# Patient Record
Sex: FEMALE | Race: BLACK OR AFRICAN AMERICAN | NOT HISPANIC OR LATINO | URBAN - METROPOLITAN AREA
[De-identification: names, ages, dates, MRNs, and addresses within clinical notes are randomized per-mention and may not be internally consistent; named-entity substitution may affect disease eponyms.]

---
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
## 2019-07-08 PROBLEM — Z00.00 ENCOUNTER FOR PREVENTIVE HEALTH EXAMINATION: Status: ACTIVE | Noted: 2019-07-08

## 2019-07-10 ENCOUNTER — APPOINTMENT (OUTPATIENT)
Dept: BARIATRICS | Facility: CLINIC | Age: 19
End: 2019-07-10
Payer: COMMERCIAL

## 2019-07-10 VITALS
DIASTOLIC BLOOD PRESSURE: 79 MMHG | HEART RATE: 98 BPM | OXYGEN SATURATION: 99 % | SYSTOLIC BLOOD PRESSURE: 127 MMHG | BODY MASS INDEX: 49.41 KG/M2 | TEMPERATURE: 98.3 F | WEIGHT: 293 LBS | HEIGHT: 64.5 IN

## 2019-07-10 PROCEDURE — 99203 OFFICE O/P NEW LOW 30 MIN: CPT

## 2019-07-10 NOTE — HISTORY OF PRESENT ILLNESS
[de-identified] : This is a super morbidly obese female with bmi of 65 who comes for potential bariatric surgery.  She did not finish high school and is working as a  at [employer] about 20 hours weekly.  She comes off to me as having a blunted affect.  I believe that she desperately needs bariatric surgery but I am concerned about her depression and want her to see Damaris Reese to see if needs ongoing support\par Also want her to start a weight loss program \par want to reevaluate following the above\par

## 2019-07-10 NOTE — ASSESSMENT
[FreeTextEntry1] : as above \par get egd\par see jude cisneros\par start medical weight loss\par and then re evaluate\par spent 30 minutes with patient

## 2019-07-10 NOTE — PHYSICAL EXAM
[Normal] : grossly intact [Obese, well nourished, in no acute distress] : obese, well nourished, in no acute distress [de-identified] : gynoid distribution [de-identified] : truncus adiposity

## 2019-07-17 ENCOUNTER — APPOINTMENT (OUTPATIENT)
Dept: BARIATRICS | Facility: CLINIC | Age: 19
End: 2019-07-17

## 2019-08-14 ENCOUNTER — APPOINTMENT (OUTPATIENT)
Dept: BARIATRICS | Facility: CLINIC | Age: 19
End: 2019-08-14

## 2019-08-30 ENCOUNTER — EMERGENCY (EMERGENCY)
Facility: HOSPITAL | Age: 19
LOS: 1 days | Discharge: HOME | End: 2019-08-30
Attending: EMERGENCY MEDICINE | Admitting: EMERGENCY MEDICINE
Payer: COMMERCIAL

## 2019-08-30 VITALS
SYSTOLIC BLOOD PRESSURE: 131 MMHG | RESPIRATION RATE: 16 BRPM | DIASTOLIC BLOOD PRESSURE: 75 MMHG | TEMPERATURE: 98 F | HEART RATE: 94 BPM | OXYGEN SATURATION: 100 %

## 2019-08-30 DIAGNOSIS — Z98.89 OTHER SPECIFIED POSTPROCEDURAL STATES: Chronic | ICD-10-CM

## 2019-08-30 PROCEDURE — 99282 EMERGENCY DEPT VISIT SF MDM: CPT

## 2019-08-30 RX ORDER — IBUPROFEN 200 MG
600 TABLET ORAL ONCE
Refills: 0 | Status: COMPLETED | OUTPATIENT
Start: 2019-08-30 | End: 2019-08-30

## 2019-08-30 RX ADMIN — Medication 600 MILLIGRAM(S): at 18:01

## 2019-08-30 NOTE — ED PROVIDER NOTE - NSFOLLOWUPINSTRUCTIONS_ED_ALL_ED_FT
Please apply warm compresses, take ibuprofen 600mg every 8 hours with food. Your symptoms could be due to salivary stone. To increase production of the saliva you should gently massage the face and suck on ivonne or jelly ranchers. Follow up with ENT in 1 week if your symptoms don't resolve. come back to ED if you have fever, redness, vision changes, ear drainage or any other concerning symptom.

## 2019-08-30 NOTE — ED PROVIDER NOTE - OBJECTIVE STATEMENT
19F with pmh of Type 2 DM (on metformin) p/w R facial swelling and mild pain x 30 min. Pain started while eating. The swelling has gone down considerably since. Report numbness at the site as well.  No f/c, ear pain, congestion, sore throat.  Pt never had a similar problem.

## 2019-08-30 NOTE — ED PROVIDER NOTE - PATIENT PORTAL LINK FT
You can access the FollowMyHealth Patient Portal offered by [facility] by registering at the following website: [email or web address]. By joining [employer]’s FollowMyHealth portal, you will also be able to view your health information using other applications (apps) compatible with our system.

## 2019-08-30 NOTE — ED PROVIDER NOTE - CLINICAL SUMMARY MEDICAL DECISION MAKING FREE TEXT BOX
19F with pmh of DM p/w R facial swelling that has now improved. Likely salivary stone vs muscle spasm, less likely infection. No signs of otitis externa. Plan for iburofen, pt given instructions for possible salivary stone.

## 2019-08-30 NOTE — ED PROVIDER NOTE - PHYSICAL EXAMINATION
GEN - NAD; well appearing; A+O x3   HEAD - NC/AT     EYES - EOMI, no conjunctival pallor, no scleral icterus  ENT -   mucous membranes  moist , no discharge. Clear TMs. No ear canal drainage.  minimal tenderness and swelling just inferior to ear lobe. No erythema. No parotid tenderness.   NECK - Neck supple  PULM - CTA b/l,  symmetric breath sounds  COR -  RRR, S1 S2, no murmurs  ABD - , ND, NT, soft, no guarding, no rebound, no masses    BACK - no CVA tenderness, nontender spine     EXTREMS - no edema, no deformity, warm and well perfused    SKIN - no rash or bruising      NEUROLOGIC - alert, sensation nl, motor 5/5 RUE/LUE/RLE/LLE. CN2-12 intact

## 2019-10-01 ENCOUNTER — EMERGENCY (EMERGENCY)
Facility: HOSPITAL | Age: 19
LOS: 1 days | Discharge: HOME | End: 2019-10-01
Attending: EMERGENCY MEDICINE | Admitting: EMERGENCY MEDICINE
Payer: COMMERCIAL

## 2019-10-01 VITALS
HEART RATE: 120 BPM | DIASTOLIC BLOOD PRESSURE: 76 MMHG | SYSTOLIC BLOOD PRESSURE: 154 MMHG | OXYGEN SATURATION: 100 % | TEMPERATURE: 99 F | RESPIRATION RATE: 18 BRPM

## 2019-10-01 DIAGNOSIS — Z98.89 OTHER SPECIFIED POSTPROCEDURAL STATES: Chronic | ICD-10-CM

## 2019-10-01 PROCEDURE — 99283 EMERGENCY DEPT VISIT LOW MDM: CPT

## 2019-10-02 VITALS — TEMPERATURE: 98 F

## 2019-10-02 LAB
ALBUMIN SERPL ELPH-MCNC: 3.9 G/DL — SIGNIFICANT CHANGE UP (ref 3.3–5)
ALP SERPL-CCNC: 52 U/L — SIGNIFICANT CHANGE UP (ref 40–120)
ALT FLD-CCNC: 13 U/L — SIGNIFICANT CHANGE UP (ref 4–33)
ANION GAP SERPL CALC-SCNC: 9 MMO/L — SIGNIFICANT CHANGE UP (ref 7–14)
AST SERPL-CCNC: 17 U/L — SIGNIFICANT CHANGE UP (ref 4–32)
BASOPHILS # BLD AUTO: 0.05 K/UL — SIGNIFICANT CHANGE UP (ref 0–0.2)
BASOPHILS NFR BLD AUTO: 0.6 % — SIGNIFICANT CHANGE UP (ref 0–2)
BILIRUB SERPL-MCNC: < 0.2 MG/DL — LOW (ref 0.2–1.2)
BUN SERPL-MCNC: 11 MG/DL — SIGNIFICANT CHANGE UP (ref 7–23)
CALCIUM SERPL-MCNC: 9.6 MG/DL — SIGNIFICANT CHANGE UP (ref 8.4–10.5)
CHLORIDE SERPL-SCNC: 102 MMOL/L — SIGNIFICANT CHANGE UP (ref 98–107)
CO2 SERPL-SCNC: 26 MMOL/L — SIGNIFICANT CHANGE UP (ref 22–31)
CREAT SERPL-MCNC: 0.99 MG/DL — SIGNIFICANT CHANGE UP (ref 0.5–1.3)
EOSINOPHIL # BLD AUTO: 0.11 K/UL — SIGNIFICANT CHANGE UP (ref 0–0.5)
EOSINOPHIL NFR BLD AUTO: 1.3 % — SIGNIFICANT CHANGE UP (ref 0–6)
GLUCOSE SERPL-MCNC: 180 MG/DL — HIGH (ref 70–99)
HCT VFR BLD CALC: 35.2 % — SIGNIFICANT CHANGE UP (ref 34.5–45)
HGB BLD-MCNC: 11 G/DL — LOW (ref 11.5–15.5)
IMM GRANULOCYTES NFR BLD AUTO: 0.2 % — SIGNIFICANT CHANGE UP (ref 0–1.5)
LIDOCAIN IGE QN: 11.9 U/L — SIGNIFICANT CHANGE UP (ref 7–60)
LYMPHOCYTES # BLD AUTO: 2.09 K/UL — SIGNIFICANT CHANGE UP (ref 1–3.3)
LYMPHOCYTES # BLD AUTO: 25.6 % — SIGNIFICANT CHANGE UP (ref 13–44)
MCHC RBC-ENTMCNC: 28.6 PG — SIGNIFICANT CHANGE UP (ref 27–34)
MCHC RBC-ENTMCNC: 31.3 % — LOW (ref 32–36)
MCV RBC AUTO: 91.7 FL — SIGNIFICANT CHANGE UP (ref 80–100)
MONOCYTES # BLD AUTO: 0.39 K/UL — SIGNIFICANT CHANGE UP (ref 0–0.9)
MONOCYTES NFR BLD AUTO: 4.8 % — SIGNIFICANT CHANGE UP (ref 2–14)
NEUTROPHILS # BLD AUTO: 5.49 K/UL — SIGNIFICANT CHANGE UP (ref 1.8–7.4)
NEUTROPHILS NFR BLD AUTO: 67.5 % — SIGNIFICANT CHANGE UP (ref 43–77)
NRBC # FLD: 0 K/UL — SIGNIFICANT CHANGE UP (ref 0–0)
PLATELET # BLD AUTO: 299 K/UL — SIGNIFICANT CHANGE UP (ref 150–400)
PMV BLD: 9.5 FL — SIGNIFICANT CHANGE UP (ref 7–13)
POTASSIUM SERPL-MCNC: 4.9 MMOL/L — SIGNIFICANT CHANGE UP (ref 3.5–5.3)
POTASSIUM SERPL-SCNC: 4.9 MMOL/L — SIGNIFICANT CHANGE UP (ref 3.5–5.3)
PROT SERPL-MCNC: 7.9 G/DL — SIGNIFICANT CHANGE UP (ref 6–8.3)
RBC # BLD: 3.84 M/UL — SIGNIFICANT CHANGE UP (ref 3.8–5.2)
RBC # FLD: 13.8 % — SIGNIFICANT CHANGE UP (ref 10.3–14.5)
SODIUM SERPL-SCNC: 137 MMOL/L — SIGNIFICANT CHANGE UP (ref 135–145)
WBC # BLD: 8.15 K/UL — SIGNIFICANT CHANGE UP (ref 3.8–10.5)
WBC # FLD AUTO: 8.15 K/UL — SIGNIFICANT CHANGE UP (ref 3.8–10.5)

## 2019-10-02 NOTE — ED PROVIDER NOTE - PHYSICAL EXAMINATION
Gen: Well appearing in NAD  Head: NC/AT  Neck: trachea midline  Resp:  No distress  Abd: soft NT ND  Ext: no deformities  Neuro:  A&O appears non focal  Skin:  Warm and dry as visualized  Psych:  Normal affect and mood

## 2019-10-02 NOTE — ED ADULT NURSE REASSESSMENT NOTE - NS ED NURSE REASSESS COMMENT FT1
No acute distress at present. pt. A&Ox4, ambulatory with steady gait. Respirations even & unlabored. MD Silver aware urine not obtained, pt. unable to give sample at this time. As per MD, ok for discharge. No acute distress at present. pt. A&Ox4, ambulatory with steady gait. Respirations even & unlabored. Pt. calm and cooperative. Denies SI/HI. MD Silver aware urine not obtained, pt. unable to give sample at this time. As per MD, ok for discharge.

## 2019-10-02 NOTE — ED PROVIDER NOTE - OBJECTIVE STATEMENT
18 yo history of DM and fatty liver presenting with a transient episode of upper abd pain that was sharp and did not radiate.  Some nausea and no vomiting or diarrhea.  Has not had similar pain before. 20 yo history of DM and fatty liver presenting with a transient episode of upper abd pain that was sharp and did not radiate.  Some nausea and no vomiting or diarrhea.  Has not had similar pain before.  Started after eating rice and beans.  Lasted about 40 minutes now pain has completely resolved.

## 2019-10-02 NOTE — ED ADULT NURSE NOTE - NSIMPLEMENTINTERV_GEN_ALL_ED
Implemented All Universal Safety Interventions:  [place] to call system. Call bell, personal items and telephone within reach. Instruct patient to call for assistance. Room bathroom lighting operational. Non-slip footwear when patient is off stretcher. Physically safe environment: no spills, clutter or unnecessary equipment. Stretcher in lowest position, wheels locked, appropriate side rails in place.

## 2019-10-02 NOTE — ED PROVIDER NOTE - CLINICAL SUMMARY MEDICAL DECISION MAKING FREE TEXT BOX
pt with upper abd pain.  Resolved on its own.  Need to consider gallstones however with resolution acute christian or biliary obstruction unlikely.  Will get labs and if normal not progress to imaging at this time.

## 2019-10-02 NOTE — ED PROVIDER NOTE - PATIENT PORTAL LINK FT
You can access the FollowMyHealth Patient Portal offered by [facility] by registering at the following website: [email or web address]. By joining [facility] [employer]’s FollowMyHealth portal, you will also be able to view your health information using other applications (apps) compatible with our system.

## 2019-10-02 NOTE — ED ADULT NURSE NOTE - OBJECTIVE STATEMENT
Pt. received in room 27, A&Ox4, ambulatory. Pt. c/o diffuse abd pain that began around 10 pm. States "I had anxiety and it feels like it went numb." Numbness resolved at present, states numbness was in abdomen. Denies n/v/d, fever, chills, SOB, chest pain, dizziness, weakness, SI/HI. Last BM this morning. 20 gauge IV inserted in right ac, positive blood return, flushes without difficulty. Respirations are even & unlabored on room air. Will continue to monitor.

## 2019-10-02 NOTE — ED ADULT NURSE NOTE - CHIEF COMPLAINT QUOTE
c/o diffused abd pain onset 1 hour PTA ,pt reports  pain subsided, pt also endorses had anxiety and felt " like everything went numb" reports symptoms resolved, denies c/p SOB or palpitations, denies fever ro chills, reports last BM this morning, " normal", hx of fatty liver and DM.

## 2019-10-13 ENCOUNTER — EMERGENCY (EMERGENCY)
Facility: HOSPITAL | Age: 19
LOS: 1 days | Discharge: HOME | End: 2019-10-13
Attending: EMERGENCY MEDICINE | Admitting: PERSONAL EMERGENCY RESPONSE ATTENDANT
Payer: COMMERCIAL

## 2019-10-13 VITALS
HEART RATE: 90 BPM | TEMPERATURE: 98 F | OXYGEN SATURATION: 100 % | SYSTOLIC BLOOD PRESSURE: 148 MMHG | RESPIRATION RATE: 18 BRPM | DIASTOLIC BLOOD PRESSURE: 91 MMHG

## 2019-10-13 VITALS
RESPIRATION RATE: 16 BRPM | TEMPERATURE: 98 F | DIASTOLIC BLOOD PRESSURE: 79 MMHG | OXYGEN SATURATION: 100 % | HEART RATE: 98 BPM | SYSTOLIC BLOOD PRESSURE: 144 MMHG

## 2019-10-13 DIAGNOSIS — Z98.89 OTHER SPECIFIED POSTPROCEDURAL STATES: Chronic | ICD-10-CM

## 2019-10-13 LAB
ALBUMIN SERPL ELPH-MCNC: 4.4 G/DL — SIGNIFICANT CHANGE UP (ref 3.3–5)
ALP SERPL-CCNC: 55 U/L — SIGNIFICANT CHANGE UP (ref 40–120)
ALT FLD-CCNC: 12 U/L — SIGNIFICANT CHANGE UP (ref 4–33)
ANION GAP SERPL CALC-SCNC: 12 MMO/L — SIGNIFICANT CHANGE UP (ref 7–14)
AST SERPL-CCNC: 13 U/L — SIGNIFICANT CHANGE UP (ref 4–32)
BASE EXCESS BLDV CALC-SCNC: 2.1 MMOL/L — SIGNIFICANT CHANGE UP
BASOPHILS # BLD AUTO: 0.04 K/UL — SIGNIFICANT CHANGE UP (ref 0–0.2)
BASOPHILS NFR BLD AUTO: 0.6 % — SIGNIFICANT CHANGE UP (ref 0–2)
BILIRUB SERPL-MCNC: 0.3 MG/DL — SIGNIFICANT CHANGE UP (ref 0.2–1.2)
BLOOD GAS VENOUS - CREATININE: 0.95 MG/DL — SIGNIFICANT CHANGE UP (ref 0.5–1.3)
BUN SERPL-MCNC: 18 MG/DL — SIGNIFICANT CHANGE UP (ref 7–23)
CALCIUM SERPL-MCNC: 10 MG/DL — SIGNIFICANT CHANGE UP (ref 8.4–10.5)
CHLORIDE BLDV-SCNC: 107 MMOL/L — SIGNIFICANT CHANGE UP (ref 96–108)
CHLORIDE SERPL-SCNC: 101 MMOL/L — SIGNIFICANT CHANGE UP (ref 98–107)
CO2 SERPL-SCNC: 25 MMOL/L — SIGNIFICANT CHANGE UP (ref 22–31)
CREAT SERPL-MCNC: 1 MG/DL — SIGNIFICANT CHANGE UP (ref 0.5–1.3)
EOSINOPHIL # BLD AUTO: 0.12 K/UL — SIGNIFICANT CHANGE UP (ref 0–0.5)
EOSINOPHIL NFR BLD AUTO: 1.7 % — SIGNIFICANT CHANGE UP (ref 0–6)
GAS PNL BLDV: 136 MMOL/L — SIGNIFICANT CHANGE UP (ref 136–146)
GLUCOSE BLDV-MCNC: 109 MG/DL — HIGH (ref 70–99)
GLUCOSE SERPL-MCNC: 115 MG/DL — HIGH (ref 70–99)
HCO3 BLDV-SCNC: 25 MMOL/L — SIGNIFICANT CHANGE UP (ref 20–27)
HCT VFR BLD CALC: 36.2 % — SIGNIFICANT CHANGE UP (ref 34.5–45)
HCT VFR BLDV CALC: 37.9 % — SIGNIFICANT CHANGE UP (ref 34.5–45)
HGB BLD-MCNC: 11.8 G/DL — SIGNIFICANT CHANGE UP (ref 11.5–15.5)
HGB BLDV-MCNC: 12.3 G/DL — SIGNIFICANT CHANGE UP (ref 11.5–15.5)
IMM GRANULOCYTES NFR BLD AUTO: 0.3 % — SIGNIFICANT CHANGE UP (ref 0–1.5)
LACTATE BLDV-MCNC: 1.5 MMOL/L — SIGNIFICANT CHANGE UP (ref 0.5–2)
LYMPHOCYTES # BLD AUTO: 1.86 K/UL — SIGNIFICANT CHANGE UP (ref 1–3.3)
LYMPHOCYTES # BLD AUTO: 26.5 % — SIGNIFICANT CHANGE UP (ref 13–44)
MCHC RBC-ENTMCNC: 28.9 PG — SIGNIFICANT CHANGE UP (ref 27–34)
MCHC RBC-ENTMCNC: 32.6 % — SIGNIFICANT CHANGE UP (ref 32–36)
MCV RBC AUTO: 88.7 FL — SIGNIFICANT CHANGE UP (ref 80–100)
MONOCYTES # BLD AUTO: 0.41 K/UL — SIGNIFICANT CHANGE UP (ref 0–0.9)
MONOCYTES NFR BLD AUTO: 5.8 % — SIGNIFICANT CHANGE UP (ref 2–14)
NEUTROPHILS # BLD AUTO: 4.57 K/UL — SIGNIFICANT CHANGE UP (ref 1.8–7.4)
NEUTROPHILS NFR BLD AUTO: 65.1 % — SIGNIFICANT CHANGE UP (ref 43–77)
NRBC # FLD: 0 K/UL — SIGNIFICANT CHANGE UP (ref 0–0)
PCO2 BLDV: 48 MMHG — SIGNIFICANT CHANGE UP (ref 41–51)
PH BLDV: 7.37 PH — SIGNIFICANT CHANGE UP (ref 7.32–7.43)
PLATELET # BLD AUTO: 298 K/UL — SIGNIFICANT CHANGE UP (ref 150–400)
PMV BLD: 9.4 FL — SIGNIFICANT CHANGE UP (ref 7–13)
PO2 BLDV: 32 MMHG — LOW (ref 35–40)
POTASSIUM BLDV-SCNC: 3.9 MMOL/L — SIGNIFICANT CHANGE UP (ref 3.4–4.5)
POTASSIUM SERPL-MCNC: 4.3 MMOL/L — SIGNIFICANT CHANGE UP (ref 3.5–5.3)
POTASSIUM SERPL-SCNC: 4.3 MMOL/L — SIGNIFICANT CHANGE UP (ref 3.5–5.3)
PROT SERPL-MCNC: 8.6 G/DL — HIGH (ref 6–8.3)
RBC # BLD: 4.08 M/UL — SIGNIFICANT CHANGE UP (ref 3.8–5.2)
RBC # FLD: 13.4 % — SIGNIFICANT CHANGE UP (ref 10.3–14.5)
SAO2 % BLDV: 53.1 % — LOW (ref 60–85)
SODIUM SERPL-SCNC: 138 MMOL/L — SIGNIFICANT CHANGE UP (ref 135–145)
WBC # BLD: 7.02 K/UL — SIGNIFICANT CHANGE UP (ref 3.8–10.5)
WBC # FLD AUTO: 7.02 K/UL — SIGNIFICANT CHANGE UP (ref 3.8–10.5)

## 2019-10-13 PROCEDURE — 71046 X-RAY EXAM CHEST 2 VIEWS: CPT | Mod: 26

## 2019-10-13 PROCEDURE — 70491 CT SOFT TISSUE NECK W/DYE: CPT | Mod: 26

## 2019-10-13 PROCEDURE — 99284 EMERGENCY DEPT VISIT MOD MDM: CPT

## 2019-10-13 RX ORDER — LIDOCAINE 4 G/100G
10 CREAM TOPICAL ONCE
Refills: 0 | Status: COMPLETED | OUTPATIENT
Start: 2019-10-13 | End: 2019-10-13

## 2019-10-13 RX ORDER — PANTOPRAZOLE SODIUM 20 MG/1
1 TABLET, DELAYED RELEASE ORAL
Qty: 14 | Refills: 0
Start: 2019-10-13 | End: 2019-10-26

## 2019-10-13 RX ADMIN — LIDOCAINE 10 MILLILITER(S): 4 CREAM TOPICAL at 15:50

## 2019-10-13 NOTE — CHART NOTE - NSCHARTNOTEFT_GEN_A_CORE
s [facility]  Head & Neck Surgery Procedure Note      Name:                  Naty Villalta	               Surgeon:   Bryce Mariscal MD  	  Date                    10/13/2019                        Assistant:  None    Record Number:	[id number]                             Anesthesia:  Topical Anesthesia       Preoperative diagnosis:	Dysphagia, unspecified (R13.10)  	  Postoperative diagnosis:	Dysphagia, unspecified (R13.10)    Procedure:		Flexible Fiberoptic Laryngoscopy  (50099)    INDICATION:  The patient is a 19 year old obese female with a past medical history significant for DM who presents to the emergency room at [facility] with a chief compliant of throat pain and facial headaches for the past several hours.  The patient has been belching and throat pain/neck pain since this morning. As per patient states that last night she ate "white fish" and then this morning she woke up and had some anterior neck pain w/ throat pain a/w belching. Pt admits she was able to eat a cracker but then felt increasing pain of throat. Sx exacerbated with swallowing. Pt notes going to  and was sent to ED for further evaluation. Patient also with chronic sinus headaches and postnasal drip. Pt states at  tried to drink some water but was unable to tolerate it. Pt denies fevers, chills, drooling, chest pain, sob, n/v/d, tongue swelling/elevation. Trauma, discharge, abdominal pain or any other complaints.    PROCEDURE: The patient was seen and her bilateral nasal cavities were prepped and sprayed with topical anesthesia of neosynephrine.   Following this, a fiberoptic flexible laryngoscope was passed into the left nasal cavity, and then slowly and meticulously moved posteriorly to the nasopharynx.  There was no evidence of clotted blood within the left anterior and posterior superior lateral nasal wall. There was clear mucous within the nasal cavity.  Once at nasopharynx the skull base and lateral walls of the eustachian tubes were examined for lesions and masses.  There was no blood or masses within the nasopharynx. There was mild prominence of the nasopharyngeal soft tissue consistent with inflammatory reaction.  The fiberoptic endoscope was then carefully directed inferiorly and moved to the hypopharynx and glottis.  There was no fullness of the base of tongue.  There was 1-2+ prominence of the tonsils bilaterally (equally) and without exudate. There was no evidence of retropharyngeal erythema or edema.  There was mild  diffuse erythema  of the pharyngeal wall and supraglottic structure consistent with GERD.  The true vocal cords appeared to be mobile bilaterally.  There was no evidence of foreign body or pooling of secretions, or obvious aspiration or penetration of liquid into the glottis.  The airway was widely patent. The patient tolerated the procedure well..

## 2019-10-13 NOTE — ED PROVIDER NOTE - PHYSICAL EXAMINATION
Vital signs reviewed.   CONSTITUTIONAL: Well-appearing; well-nourished; in no apparent distress. Non-toxic appearing.   HEAD: Normocephalic, atraumatic.  EYES: PERRL, EOM intact, conjunctiva and sclera WNL.  ENT: normal nose; no rhinorrhea;  +enlarged tonsils Rt >Lt, uvula midline. No FB appreciated. Pt speaking in full sentences. No edema. No tongue elevation. No ludwigs nots. no lymphadenopathy.  NECK/LYMPH: Supple; non-tender; no cervical lymphadenopathy.  CARD: Normal S1, S2; no murmurs, rubs, or gallops noted.  RESP: Normal chest excursion with respiration; breath sounds clear and equal bilaterally; no wheezes, rhonchi, or rales.  EXT/MS: moves all extremities;   SKIN: Normal for age and race;   NEURO: Awake, alert, oriented x 3, no gross deficits  PSYCH: Normal mood; appropriate affect.

## 2019-10-13 NOTE — ED PROVIDER NOTE - CARE PROVIDER_API CALL
Bryce Mariscal)  Otolaryngology  [address] [place] [address], [address]  [place] [address]  Phone: [phone]  Fax: [phone]  Follow Up Time: 1-3 Days

## 2019-10-13 NOTE — ED ADULT NURSE NOTE - OBJECTIVE STATEMENT
19 yr old female presents to the ER with c/o feeling like there is something in her throat. Pt states she was chewing gum, spit the gm out and then felt like there was something in her throat no drooling noted no respiratory distress noted pt appears comfortable

## 2019-10-13 NOTE — ED ADULT NURSE NOTE - CHIEF COMPLAINT QUOTE
Pt  c/o "something stuck in her throat and belching  after chewing gum. Denies difficulty swallowing.  pt with hx of hypothyroidism

## 2019-10-13 NOTE — ED PROVIDER NOTE - NSFOLLOWUPCLINICS_GEN_ALL_ED_FT
[facility]  Gastroenterology  [address] [place] [id number]  Phone: [phone]  Fax:   Follow Up Time: 1-3 Days

## 2019-10-13 NOTE — ED PROVIDER NOTE - CLINICAL SUMMARY MEDICAL DECISION MAKING FREE TEXT BOX
Patient is a 19 y.o female with PMHx of DM who presents to ED c/o belching and throat pain/neck pain since this morning. DDx- includes but not limited to; r/o FB vs abscess. Plan- labs, fluids ct neck, viscous lidocaine, ENT consult.

## 2019-10-13 NOTE — ED ADULT TRIAGE NOTE - CHIEF COMPLAINT QUOTE
Pt  c/o "something stuck in her throat and belching  after chewing gum. Denies difficulty swallowing Pt  c/o "something stuck in her throat and belching  after chewing gum. Denies difficulty swallowing.  pt with hx of hypothyroidism

## 2019-10-13 NOTE — CHART NOTE - NSCHARTNOTEFT_GEN_A_CORE
s [facility]  Head & Neck Surgery Procedure Note    Name:                  Naty Villalta	               Surgeon:   Bryce Mariscal MD  	  Date                    10/13/2019                        Assistant:  None    Record Number:	[id number]                             Anesthesia:  Topical Anesthesia    Preoperative diagnosis:	Acute maxillary sinusitis, unspecified (J01.00);  Acute Pansinusitis (J01.40)    Postoperative diagnosis:	Same    Procedure:	              Bilateral maxillary sinus endoscopy  (12024)                                            Diagnostic Sphenoid Sinus Endoscopy (28356)      INDICATION:  The patient is a 19 year old obese female with a past medical history significant for DM who presents to the emergency room at [facility] with a chief compliant of throat pain and facial headaches for the past several hours.  The patient has been belching and throat pain/neck pain since this morning. As per patient states that last night she ate "white fish" and then this morning she woke up and had some anterior neck pain w/ throat pain a/w belching. Pt admits she was able to eat a cracker but then felt increasing pain of throat. Sx exacerbated with swallowing. Pt notes going to  and was sent to ED for further evaluation. Patient also with chronic sinus headaches and postnasal drip. Pt states at  tried to drink some water but was unable to tolerate it. Pt denies fevers, chills, drooling, chest pain, sob, n/v/d, tongue swelling/elevation. Trauma, discharge, abdominal pain or any other complaints.    PROCEDURE:  The patient was seen and her nasal cavities were prepped and sprayed with topical neosynephrine anesthesia.   Following this, a zero degree flexible endoscope was passed into the left nasal vault, and passed posteriorly to the nasopharynx.  There was no evidence of any blood emanating from the left posterior superior lateral nasal wall. The scope was then slowly withdrawn and then rotated superiorly to visualize the middle turbinate and the inferior meatus and osteomeatal complex. The OMC on the left side appeared patent with no evidence of pus or obstruction.  The Maxillary sinus on the left side was then accessed through the inferior meatus.  The maxillary sinus had mild to moderate amount of mucoserous fluid within it without any evidence of masses or lesions.  The frontal duct was then inspected and appeared clear without any mucoid material flowing from it.  The Septum appeared straight.  Next the endoscope was passed posteriorly to the sphenoid sinus. The sphenoid sinus was identified 30 degrees up from the nasal floor and approximately 6cm posterior to the nasal sill. The left sphenoid sinus was entered and had no evidence of purulence or masses. The scope was then slowly withdrawn.  The zero degree endoscope was then introduced into the right nasal cavity and passed posteriorly to the nasopharynx. There were no masses visible.  There was no evidence of any bleeding emanating from the right posterior septum.  The endoscope was then rotated superiorly to visualize the middle turbinate and the inferior meatus and osteomeatal complex. The Maxillary sinus on the right side was then accessed via the inferior meatus.  There was a minimal amount of mucoserous fluid within the maxillary sinus with no evidence of any masses or pus.  Again the septum appeared to be straight.  The scope was then passed posteriorly to the sphenoid sinus. The right sphenoid sinus was entered and had a minimal amount of mucoserous material within it.  The scope was then withdrawn.  The patient tolerated the procedure well.  There were no areas of telangiectasia along the anterior septum.  The scope was then withdrawn.  The patient tolerated the procedure well. [facility]  Head & Neck Surgery Procedure Note    Name:                  Naty Villalta	               Surgeon:   Bryce Mariscal MD  	  Date                    10/13/2019                        Assistant:  None    Record Number:	[id number]                             Anesthesia:  Topical Anesthesia    Preoperative diagnosis:	Rhinitis    Postoperative diagnosis:	Same    Procedure:	              Nasal Endoscopoy      INDICATION:  The patient is a 19 year old obese female with a past medical history significant for DM who presents to the emergency room at [facility] with a chief compliant of throat pain and facial headaches for the past several hours.  The patient has been belching and throat pain/neck pain since this morning. As per patient states that last night she ate "white fish" and then this morning she woke up and had some anterior neck pain w/ throat pain a/w belching. Pt admits she was able to eat a cracker but then felt increasing pain of throat. Sx exacerbated with swallowing. Pt notes going to  and was sent to ED for further evaluation. Patient also with chronic sinus headaches and postnasal drip. Pt states at  tried to drink some water but was unable to tolerate it. Pt denies fevers, chills, drooling, chest pain, sob, n/v/d, tongue swelling/elevation. Trauma, discharge, abdominal pain or any other complaints.    PROCEDURE:  The patient was seen and her nasal cavities were prepped and sprayed with topical neosynephrine anesthesia.   Following this, a zero degree flexible endoscope was passed into the left nasal vault, and passed posteriorly to the nasopharynx.  There was no evidence of any blood emanating from the left posterior superior lateral nasal wall. The scope was then slowly withdrawn and then rotated superiorly to visualize the middle turbinate and the inferior meatus and osteomeatal complex. The OMC on the left side appeared patent with no evidence of pus or obstruction.  The Maxillary sinus on the left side was then accessed through the inferior meatus.  The maxillary sinus had mild to moderate amount of mucoserous fluid within it without any evidence of masses or lesions.  The frontal duct was then inspected and appeared clear without any mucoid material flowing from it.  The Septum appeared straight.  Next the endoscope was passed posteriorly to the sphenoid sinus. The sphenoid sinus was identified 30 degrees up from the nasal floor and approximately 6cm posterior to the nasal sill. The left sphenoid sinus was entered and had no evidence of purulence or masses. The scope was then slowly withdrawn.  The zero degree endoscope was then introduced into the right nasal cavity and passed posteriorly to the nasopharynx. There were no masses visible.  There was no evidence of any bleeding emanating from the right posterior septum.  The endoscope was then rotated superiorly to visualize the middle turbinate and the inferior meatus and osteomeatal complex. The Maxillary sinus on the right side was then accessed via the inferior meatus.  There was a minimal amount of mucoserous fluid within the maxillary sinus with no evidence of any masses or pus.  Again the septum appeared to be straight.  The scope was then passed posteriorly to the sphenoid sinus. The right sphenoid sinus was entered and had a minimal amount of mucoserous material within it.  The scope was then withdrawn.  The patient tolerated the procedure well.  There were no areas of telangiectasia along the anterior septum.  The scope was then withdrawn.  The patient tolerated the procedure well.

## 2019-10-13 NOTE — ED PROVIDER NOTE - PROGRESS NOTE DETAILS
ALETA Gordon- spoke with ENT, states they will come and scope patient. ALETA Gordon- paged ENT to see about scoping patient. States that attending Dr. Mariscal will be by to scope patient. pt no distress talking in clear full sentences. Dr Mariscal at bedside. ENT consult pending. Endorse to DR Valadez Attending MD Zhang.  Pt signed out to me in stable condition pending ENT TBDC.  Pt is a 20 yo diabetic on metformin, ate fish yesterday FB sensation in throat.  ENT at bedside, told nursing can go.  Will close loop with ENT and likely d/c.  Feels better. pt no distress talking in clear full sentences. Dr Mariscal at bedside. ENT consult pending. Endorse to DR Zhang ALETA Gordon- Patient tolerating PO, CT scan neg for acute finding. ENT scoped, recommend PPI and diet as tolerated and patient advised to f/u outpatient. Pt eager to be dc home. All questions and concerns addressed. Advise soft diet. No complaints noted. Advised to return if worsening sx.

## 2019-10-13 NOTE — ED PROVIDER NOTE - PLAN OF CARE
Patient advised to follow up with PRIMARY CARE DOCTOR IN 1-2 DAYS   and told to return to the emergency department immediately for any new or concerning symptoms OR ANY OTHER COMPLAINTS. Patient agrees with plan.    Eat soft diet  Rest, stay hydrated   Take medication prescribed as directed   Return if any new or worsening symptoms   Follow with ENT and Gastroenterologist

## 2019-10-13 NOTE — ED PROVIDER NOTE - ATTENDING CONTRIBUTION TO CARE
Attending Statement: I have reviewed and agree with all pertinent clinical information, including history and physical exam and agree with treatment plan of the PA, except as noted.  18yo F hx of DM on metformin, depression from home co "throat pain since this morning" pt states "Hurts to swallow" "Something is there" pt has been able to eat, states she had fish yesterday, but doesn't recall any bone getting stuck. This morning at a lunch able, no vomit. no drooling. no change in voice. no fever/chills. no chest pain no sob no cough no abdominal pain pt went to two different UC before coming to ED.   Vital signs noted. mmm no drooling no stridor ambulatory in ED no distress. supple neck. talking in full clear sentences. mild swelling of tonsil right greater than left. tonsils not touching. no exudate. normal S1-S2 obese female nt abdomen  plan labs, ct neck, ENt to see pt.

## 2019-10-13 NOTE — ED PROVIDER NOTE - OBJECTIVE STATEMENT
HPI: Patient is a 19 y.o female with PMHx of DM who presents to ED c/o belching and throat pain/neck pain since this morning. As per patient states that last night she ate "white fish" and then this morning she woke up and had some anterior neck pain w/ throat pain a/w belching. Pt admits she was able to eat a cracker but then felt increasing pain of throat. Sx exacerbated with swallowing. Pt notes going to  and was sent to ED for further evaluation. Pt states at  tried to drink some water but was unable to tolerate it. Pt denies fevers, chills, drooling, chest pain, sob, n/v/d, tongue swelling/elevation. Trauma, discharge, abdominal pain or any other complaints.

## 2019-10-13 NOTE — CONSULT NOTE ADULT - COMMENTS
Vital Signs Last 24 Hrs  T(C): 36.7 (13 Oct 2019 18:16), Max: 36.7 (13 Oct 2019 13:45)  T(F): 98 (13 Oct 2019 18:16), Max: 98 (13 Oct 2019 13:45)  HR: 90 (13 Oct 2019 18:16) (90 - 98)  BP: 148/91 (13 Oct 2019 18:16) (144/79 - 148/91)  BP(mean): --  RR: 18 (13 Oct 2019 18:16) (16 - 18)  SpO2: 100% (13 Oct 2019 18:16) (100% - 100%)

## 2019-10-13 NOTE — ED PROVIDER NOTE - CARE PLAN
Principal Discharge DX:	Throat pain  Assessment and plan of treatment:	Patient advised to follow up with PRIMARY CARE DOCTOR IN 1-2 DAYS   and told to return to the emergency department immediately for any new or concerning symptoms OR ANY OTHER COMPLAINTS. Patient agrees with plan.    Eat soft diet  Rest, stay hydrated   Take medication prescribed as directed   Return if any new or worsening symptoms   Follow with ENT and Gastroenterologist

## 2019-10-13 NOTE — ED PROVIDER NOTE - PATIENT PORTAL LINK FT
You can access the FollowMyHealth Patient Portal offered by [facility] by registering at the following website: [email or web address]. By joining [employer]’s FollowMyHealth portal, you will also be able to view your health information using other applications (apps) compatible with our system.

## 2019-10-13 NOTE — CONSULT NOTE ADULT - ENMT COMMENTS
Flexible FIberoptic Laryngoscopy: clear nasopharynx to glottis, tvc mobile b/l, diffuse GERD, tvc mobile b/l, airway patent

## 2019-10-13 NOTE — CONSULT NOTE ADULT - NOSE
inflamed mucosa/congestion/nasal/sinus endoscopy: maxillary sinus with mucoid fluid b/l via inferior meatus, ss clear b/l/clear discharge

## 2019-10-13 NOTE — CONSULT NOTE ADULT - ASSESSMENT
Assessment:  The patient is a 19 year old obese female with a past medical history significant for DM who presents to the emergency room at [facility] with a chief compliant of throat pain and facial headaches for the past several hours. DDx: GERD and pharyngeal abrasion. Airway widely patent, no e/o foreign body    Plan:  1) PPI  2) diet as tolerated  3) f/u in ENT clinic PRN [phone]

## 2019-10-13 NOTE — CONSULT NOTE ADULT - ENT GEN HX ROS MEA POS PC
post-nasal discharge/throat pain/dysphagia/nasal discharge/nasal obstruction/sinus symptoms/nasal congestion

## 2019-10-13 NOTE — CONSULT NOTE ADULT - SUBJECTIVE AND OBJECTIVE BOX
HPI: The patient is a 19 year old obese female with a past medical history significant for DM who presents to the emergency room at [facility] with a chief compliant of throat pain and facial headaches for the past several hours.  The patient has been belching and throat pain/neck pain since this morning. As per patient states that last night she ate "white fish" and then this morning she woke up and had some anterior neck pain w/ throat pain a/w belching. Pt admits she was able to eat a cracker but then felt increasing pain of throat. Sx exacerbated with swallowing. Pt notes going to  and was sent to ED for further evaluation. Patient also with chronic sinus headaches and postnasal drip. Pt states at  tried to drink some water but was unable to tolerate it. Pt denies fevers, chills, drooling, chest pain, sob, n/v/d, tongue swelling/elevation. Trauma, discharge, abdominal pain or any other complaints.	    HIV:    HIV Status:  · Offered: Declined	    PAST MEDICAL/SURGICAL/FAMILY/SOCIAL HISTORY:    Past Medical History:  Depression    Diabetes    Hypothyroid    Menorrhagia.     Past Surgical History:  History of appendectomy.     Tobacco Usage:  · Tobacco Usage	Unknown if ever smoked	    ALLERGIES AND HOME MEDICATIONS:   Allergies:        Allergies:  	No Known Allergies:     Home Medications:   * Outpatient Medication Status not yet specified    LABS:  CBC Full  -  ( 13 Oct 2019 15:30 )  WBC Count : 7.02 K/uL  Hemoglobin : 11.8 g/dL  Hematocrit : 36.2 %  Platelet Count - Automated : 298 K/uL  Mean Cell Volume : 88.7 fL  Mean Cell Hemoglobin : 28.9 pg  Mean Cell Hemoglobin Concentration : 32.6 %  Auto Neutrophil # : 4.57 K/uL  Auto Lymphocyte # : 1.86 K/uL  Auto Monocyte # : 0.41 K/uL  Auto Eosinophil # : 0.12 K/uL  Auto Basophil # : 0.04 K/uL  Auto Neutrophil % : 65.1 %  Auto Lymphocyte % : 26.5 %  Auto Monocyte % : 5.8 %  Auto Eosinophil % : 1.7 %  Auto Basophil % : 0.6 %

## 2019-10-16 ENCOUNTER — APPOINTMENT (OUTPATIENT)
Dept: BARIATRICS | Facility: CLINIC | Age: 19
End: 2019-10-16

## 2019-10-16 VITALS — HEIGHT: 64.5 IN | BODY MASS INDEX: 49.41 KG/M2 | WEIGHT: 293 LBS

## 2019-11-18 ENCOUNTER — APPOINTMENT (OUTPATIENT)
Dept: BARIATRICS | Facility: CLINIC | Age: 19
End: 2019-11-18

## 2019-11-20 ENCOUNTER — APPOINTMENT (OUTPATIENT)
Dept: BARIATRICS | Facility: CLINIC | Age: 19
End: 2019-11-20
Payer: COMMERCIAL

## 2019-11-20 VITALS — BODY MASS INDEX: 49.41 KG/M2 | WEIGHT: 293 LBS | HEIGHT: 64.5 IN

## 2019-11-20 PROCEDURE — 97802 MEDICAL NUTRITION INDIV IN: CPT

## 2019-11-21 ENCOUNTER — EMERGENCY (EMERGENCY)
Facility: HOSPITAL | Age: 19
LOS: 1 days | Discharge: HOME | End: 2019-11-21
Attending: EMERGENCY MEDICINE | Admitting: EMERGENCY MEDICINE
Payer: COMMERCIAL

## 2019-11-21 VITALS
HEART RATE: 103 BPM | RESPIRATION RATE: 16 BRPM | DIASTOLIC BLOOD PRESSURE: 67 MMHG | SYSTOLIC BLOOD PRESSURE: 132 MMHG | OXYGEN SATURATION: 100 % | TEMPERATURE: 98 F

## 2019-11-21 DIAGNOSIS — Z98.89 OTHER SPECIFIED POSTPROCEDURAL STATES: Chronic | ICD-10-CM

## 2019-11-21 PROCEDURE — 99284 EMERGENCY DEPT VISIT MOD MDM: CPT

## 2019-11-22 VITALS
OXYGEN SATURATION: 100 % | HEART RATE: 86 BPM | RESPIRATION RATE: 16 BRPM | SYSTOLIC BLOOD PRESSURE: 126 MMHG | DIASTOLIC BLOOD PRESSURE: 88 MMHG

## 2019-11-22 LAB
ALBUMIN SERPL ELPH-MCNC: 4.3 G/DL — SIGNIFICANT CHANGE UP (ref 3.3–5)
ALP SERPL-CCNC: 58 U/L — SIGNIFICANT CHANGE UP (ref 40–120)
ALT FLD-CCNC: 12 U/L — SIGNIFICANT CHANGE UP (ref 4–33)
ANION GAP SERPL CALC-SCNC: 12 MMO/L — SIGNIFICANT CHANGE UP (ref 7–14)
AST SERPL-CCNC: 12 U/L — SIGNIFICANT CHANGE UP (ref 4–32)
BASOPHILS # BLD AUTO: 0.05 K/UL — SIGNIFICANT CHANGE UP (ref 0–0.2)
BASOPHILS NFR BLD AUTO: 0.6 % — SIGNIFICANT CHANGE UP (ref 0–2)
BILIRUB SERPL-MCNC: 0.2 MG/DL — SIGNIFICANT CHANGE UP (ref 0.2–1.2)
BUN SERPL-MCNC: 19 MG/DL — SIGNIFICANT CHANGE UP (ref 7–23)
CALCIUM SERPL-MCNC: 9.7 MG/DL — SIGNIFICANT CHANGE UP (ref 8.4–10.5)
CHLORIDE SERPL-SCNC: 101 MMOL/L — SIGNIFICANT CHANGE UP (ref 98–107)
CO2 SERPL-SCNC: 24 MMOL/L — SIGNIFICANT CHANGE UP (ref 22–31)
CREAT SERPL-MCNC: 1.05 MG/DL — SIGNIFICANT CHANGE UP (ref 0.5–1.3)
EOSINOPHIL # BLD AUTO: 0.05 K/UL — SIGNIFICANT CHANGE UP (ref 0–0.5)
EOSINOPHIL NFR BLD AUTO: 0.6 % — SIGNIFICANT CHANGE UP (ref 0–6)
GLUCOSE SERPL-MCNC: 128 MG/DL — HIGH (ref 70–99)
HCT VFR BLD CALC: 38.7 % — SIGNIFICANT CHANGE UP (ref 34.5–45)
HGB BLD-MCNC: 12.1 G/DL — SIGNIFICANT CHANGE UP (ref 11.5–15.5)
IMM GRANULOCYTES NFR BLD AUTO: 0.2 % — SIGNIFICANT CHANGE UP (ref 0–1.5)
LYMPHOCYTES # BLD AUTO: 2.09 K/UL — SIGNIFICANT CHANGE UP (ref 1–3.3)
LYMPHOCYTES # BLD AUTO: 25.8 % — SIGNIFICANT CHANGE UP (ref 13–44)
MCHC RBC-ENTMCNC: 27.8 PG — SIGNIFICANT CHANGE UP (ref 27–34)
MCHC RBC-ENTMCNC: 31.3 % — LOW (ref 32–36)
MCV RBC AUTO: 88.8 FL — SIGNIFICANT CHANGE UP (ref 80–100)
MONOCYTES # BLD AUTO: 0.49 K/UL — SIGNIFICANT CHANGE UP (ref 0–0.9)
MONOCYTES NFR BLD AUTO: 6 % — SIGNIFICANT CHANGE UP (ref 2–14)
NEUTROPHILS # BLD AUTO: 5.41 K/UL — SIGNIFICANT CHANGE UP (ref 1.8–7.4)
NEUTROPHILS NFR BLD AUTO: 66.8 % — SIGNIFICANT CHANGE UP (ref 43–77)
NRBC # FLD: 0 K/UL — SIGNIFICANT CHANGE UP (ref 0–0)
PLATELET # BLD AUTO: 299 K/UL — SIGNIFICANT CHANGE UP (ref 150–400)
PMV BLD: 10 FL — SIGNIFICANT CHANGE UP (ref 7–13)
POTASSIUM SERPL-MCNC: 3.2 MMOL/L — LOW (ref 3.5–5.3)
POTASSIUM SERPL-SCNC: 3.2 MMOL/L — LOW (ref 3.5–5.3)
PROT SERPL-MCNC: 9 G/DL — HIGH (ref 6–8.3)
RBC # BLD: 4.36 M/UL — SIGNIFICANT CHANGE UP (ref 3.8–5.2)
RBC # FLD: 13.6 % — SIGNIFICANT CHANGE UP (ref 10.3–14.5)
SODIUM SERPL-SCNC: 137 MMOL/L — SIGNIFICANT CHANGE UP (ref 135–145)
TROPONIN T, HIGH SENSITIVITY: < 6 NG/L — SIGNIFICANT CHANGE UP (ref ?–14)
WBC # BLD: 8.11 K/UL — SIGNIFICANT CHANGE UP (ref 3.8–10.5)
WBC # FLD AUTO: 8.11 K/UL — SIGNIFICANT CHANGE UP (ref 3.8–10.5)

## 2019-11-22 PROCEDURE — 71046 X-RAY EXAM CHEST 2 VIEWS: CPT | Mod: 26

## 2019-11-22 RX ORDER — POTASSIUM CHLORIDE 20 MEQ
20 PACKET (EA) ORAL ONCE
Refills: 0 | Status: COMPLETED | OUTPATIENT
Start: 2019-11-22 | End: 2019-11-22

## 2019-11-22 RX ORDER — FAMOTIDINE 10 MG/ML
20 INJECTION INTRAVENOUS ONCE
Refills: 0 | Status: COMPLETED | OUTPATIENT
Start: 2019-11-22 | End: 2019-11-22

## 2019-11-22 RX ORDER — SODIUM CHLORIDE 9 MG/ML
1000 INJECTION INTRAMUSCULAR; INTRAVENOUS; SUBCUTANEOUS ONCE
Refills: 0 | Status: COMPLETED | OUTPATIENT
Start: 2019-11-22 | End: 2019-11-22

## 2019-11-22 RX ADMIN — SODIUM CHLORIDE 1000 MILLILITER(S): 9 INJECTION INTRAMUSCULAR; INTRAVENOUS; SUBCUTANEOUS at 03:30

## 2019-11-22 RX ADMIN — FAMOTIDINE 20 MILLIGRAM(S): 10 INJECTION INTRAVENOUS at 03:30

## 2019-11-22 RX ADMIN — Medication 20 MILLIEQUIVALENT(S): at 05:44

## 2019-11-22 NOTE — ED PROVIDER NOTE - PHYSICAL EXAMINATION
Vitals: WNL  Gen: obese, laying comfortably in NAD  Head: NCAT  ENT: sclerae white, anicterus, moist mucous membranes. No exudates.   CV: RRR. Audible S1 and S2. No murmurs, rubs, gallops, S3, nor S4, 2+ radial and DP pulses   Pulm: Clear to auscultation bilaterally. No wheezes, rales, or rhonchi  Abd: soft, normoactive BS x4, NTND, no rebound, no guarding, no rashes  Musculoskeletal:  No peripheral edema  Skin: no lesions or scars noted  Neurologic: AAOx3  : no CVA tenderness  Psych: anxious Vitals: tachycardic, remainder wnl  Gen: obese, laying comfortably in NAD  Head: NCAT  ENT: sclerae white, anicterus, moist mucous membranes. No exudates.   CV: RRR. Audible S1 and S2. No murmurs, rubs, gallops, S3, nor S4, 2+ radial and DP pulses   Pulm: Clear to auscultation bilaterally. No wheezes, rales, or rhonchi  Abd: soft, normoactive BS x4, NTND, no rebound, no guarding, no rashes  Musculoskeletal:  No peripheral edema  Skin: no lesions or scars noted  Neurologic: AAOx3  : no CVA tenderness  Psych: anxious

## 2019-11-22 NOTE — ED ADULT NURSE NOTE - CHIEF COMPLAINT QUOTE
midsternal non-radiating chest pain x few days. Denies SOB currently. Denies n/v. States increased stressors recently. Denies any recent long trips. Hx DM

## 2019-11-22 NOTE — ED PROVIDER NOTE - OBJECTIVE STATEMENT
18yo F h/o DM2 on metformin p/w substernal cp that occurs after she eats that started several days ago. + burping. not a/w exertion, sob, n/v, f/c, cough. also compalining of a strange taste in her throat. has not taken any meds for it. no leg swelling, recent travl, ocp use, family history of early cardiac disease.

## 2019-11-22 NOTE — ED ADULT NURSE NOTE - OBJECTIVE STATEMENT
Pt st" For a couple of days a week I have been belching a lot and have reflux feeling." pt calm smiling affect. placed on cm +tachy. denies short of breath no dizziness. Resident at bedside. sl fluids labs pepcid given. will reassess.

## 2019-11-22 NOTE — ED PROVIDER NOTE - PATIENT PORTAL LINK FT
You can access the FollowMyHealth Patient Portal offered by [facility] by registering at the following website: [email or web address]. By joining [employer]’s FollowMyHealth portal, you will also be able to view your health information using other applications (apps) compatible with our system.

## 2019-11-22 NOTE — ED PROVIDER NOTE - CLINICAL SUMMARY MEDICAL DECISION MAKING FREE TEXT BOX
20yo F h/o DM2 on metformin p/w substernal cp that occurs after she eats that started several days ago. nontoxic appearing obese female with no focal findings. s/s suggestive of gerd, however given pt is obese and h/o DM, will obtain trop although low suspicion for ACS. Cecile att: 20yo F h/o DM2 on metformin p/w substernal cp that occurs after she eats that started several days ago. nontoxic appearing obese female with no focal findings. s/s suggestive of gerd, however given pt is obese and h/o DM, will obtain trop although low suspicion for ACS.

## 2019-12-11 ENCOUNTER — MOBILE ON CALL (OUTPATIENT)
Age: 19
End: 2019-12-11

## 2019-12-16 ENCOUNTER — EMERGENCY (EMERGENCY)
Facility: HOSPITAL | Age: 19
LOS: 1 days | Discharge: HOME | End: 2019-12-16
Attending: EMERGENCY MEDICINE | Admitting: EMERGENCY MEDICINE
Payer: COMMERCIAL

## 2019-12-16 VITALS — OXYGEN SATURATION: 100 % | HEART RATE: 89 BPM | TEMPERATURE: 99 F | RESPIRATION RATE: 16 BRPM

## 2019-12-16 VITALS
OXYGEN SATURATION: 100 % | HEART RATE: 100 BPM | TEMPERATURE: 99 F | DIASTOLIC BLOOD PRESSURE: 51 MMHG | SYSTOLIC BLOOD PRESSURE: 122 MMHG | RESPIRATION RATE: 16 BRPM

## 2019-12-16 DIAGNOSIS — Z98.89 OTHER SPECIFIED POSTPROCEDURAL STATES: Chronic | ICD-10-CM

## 2019-12-16 LAB
ALBUMIN SERPL ELPH-MCNC: 4.2 G/DL — SIGNIFICANT CHANGE UP (ref 3.3–5)
ALP SERPL-CCNC: 55 U/L — SIGNIFICANT CHANGE UP (ref 40–120)
ALT FLD-CCNC: 12 U/L — SIGNIFICANT CHANGE UP (ref 4–33)
ANION GAP SERPL CALC-SCNC: 14 MMO/L — SIGNIFICANT CHANGE UP (ref 7–14)
AST SERPL-CCNC: 17 U/L — SIGNIFICANT CHANGE UP (ref 4–32)
BASOPHILS # BLD AUTO: 0.04 K/UL — SIGNIFICANT CHANGE UP (ref 0–0.2)
BASOPHILS NFR BLD AUTO: 0.4 % — SIGNIFICANT CHANGE UP (ref 0–2)
BILIRUB SERPL-MCNC: 0.6 MG/DL — SIGNIFICANT CHANGE UP (ref 0.2–1.2)
BUN SERPL-MCNC: 12 MG/DL — SIGNIFICANT CHANGE UP (ref 7–23)
CALCIUM SERPL-MCNC: 9.6 MG/DL — SIGNIFICANT CHANGE UP (ref 8.4–10.5)
CHLORIDE SERPL-SCNC: 101 MMOL/L — SIGNIFICANT CHANGE UP (ref 98–107)
CO2 SERPL-SCNC: 21 MMOL/L — LOW (ref 22–31)
CREAT SERPL-MCNC: 1.06 MG/DL — SIGNIFICANT CHANGE UP (ref 0.5–1.3)
EOSINOPHIL # BLD AUTO: 0.07 K/UL — SIGNIFICANT CHANGE UP (ref 0–0.5)
EOSINOPHIL NFR BLD AUTO: 0.6 % — SIGNIFICANT CHANGE UP (ref 0–6)
GLUCOSE SERPL-MCNC: 95 MG/DL — SIGNIFICANT CHANGE UP (ref 70–99)
HCT VFR BLD CALC: 39.5 % — SIGNIFICANT CHANGE UP (ref 34.5–45)
HGB BLD-MCNC: 12.5 G/DL — SIGNIFICANT CHANGE UP (ref 11.5–15.5)
IMM GRANULOCYTES NFR BLD AUTO: 0.3 % — SIGNIFICANT CHANGE UP (ref 0–1.5)
LYMPHOCYTES # BLD AUTO: 1.86 K/UL — SIGNIFICANT CHANGE UP (ref 1–3.3)
LYMPHOCYTES # BLD AUTO: 16.4 % — SIGNIFICANT CHANGE UP (ref 13–44)
MCHC RBC-ENTMCNC: 28.5 PG — SIGNIFICANT CHANGE UP (ref 27–34)
MCHC RBC-ENTMCNC: 31.6 % — LOW (ref 32–36)
MCV RBC AUTO: 90 FL — SIGNIFICANT CHANGE UP (ref 80–100)
MONOCYTES # BLD AUTO: 0.56 K/UL — SIGNIFICANT CHANGE UP (ref 0–0.9)
MONOCYTES NFR BLD AUTO: 5 % — SIGNIFICANT CHANGE UP (ref 2–14)
NEUTROPHILS # BLD AUTO: 8.75 K/UL — HIGH (ref 1.8–7.4)
NEUTROPHILS NFR BLD AUTO: 77.3 % — HIGH (ref 43–77)
NRBC # FLD: 0 K/UL — SIGNIFICANT CHANGE UP (ref 0–0)
PLATELET # BLD AUTO: 269 K/UL — SIGNIFICANT CHANGE UP (ref 150–400)
PMV BLD: 10.1 FL — SIGNIFICANT CHANGE UP (ref 7–13)
POTASSIUM SERPL-MCNC: 4 MMOL/L — SIGNIFICANT CHANGE UP (ref 3.5–5.3)
POTASSIUM SERPL-SCNC: 4 MMOL/L — SIGNIFICANT CHANGE UP (ref 3.5–5.3)
PROT SERPL-MCNC: 8.5 G/DL — HIGH (ref 6–8.3)
RBC # BLD: 4.39 M/UL — SIGNIFICANT CHANGE UP (ref 3.8–5.2)
RBC # FLD: 13.3 % — SIGNIFICANT CHANGE UP (ref 10.3–14.5)
SODIUM SERPL-SCNC: 136 MMOL/L — SIGNIFICANT CHANGE UP (ref 135–145)
WBC # BLD: 11.31 K/UL — HIGH (ref 3.8–10.5)
WBC # FLD AUTO: 11.31 K/UL — HIGH (ref 3.8–10.5)

## 2019-12-16 PROCEDURE — 70450 CT HEAD/BRAIN W/O DYE: CPT | Mod: 26

## 2019-12-16 PROCEDURE — 99284 EMERGENCY DEPT VISIT MOD MDM: CPT

## 2019-12-16 RX ORDER — SODIUM CHLORIDE 9 MG/ML
1000 INJECTION INTRAMUSCULAR; INTRAVENOUS; SUBCUTANEOUS ONCE
Refills: 0 | Status: COMPLETED | OUTPATIENT
Start: 2019-12-16 | End: 2019-12-16

## 2019-12-16 RX ORDER — METOCLOPRAMIDE HCL 10 MG
10 TABLET ORAL ONCE
Refills: 0 | Status: COMPLETED | OUTPATIENT
Start: 2019-12-16 | End: 2019-12-16

## 2019-12-16 RX ORDER — SODIUM CHLORIDE 9 MG/ML
1000 INJECTION, SOLUTION INTRAVENOUS
Refills: 0 | Status: DISCONTINUED | OUTPATIENT
Start: 2019-12-16 | End: 2019-12-16

## 2019-12-16 RX ORDER — KETOROLAC TROMETHAMINE 30 MG/ML
15 SYRINGE (ML) INJECTION ONCE
Refills: 0 | Status: DISCONTINUED | OUTPATIENT
Start: 2019-12-16 | End: 2019-12-16

## 2019-12-16 RX ORDER — PROCHLORPERAZINE MALEATE 5 MG
10 TABLET ORAL ONCE
Refills: 0 | Status: COMPLETED | OUTPATIENT
Start: 2019-12-16 | End: 2019-12-16

## 2019-12-16 RX ADMIN — Medication 10 MILLIGRAM(S): at 21:18

## 2019-12-16 RX ADMIN — Medication 15 MILLIGRAM(S): at 21:17

## 2019-12-16 RX ADMIN — Medication 10 MILLIGRAM(S): at 23:58

## 2019-12-16 RX ADMIN — SODIUM CHLORIDE 1000 MILLILITER(S): 9 INJECTION INTRAMUSCULAR; INTRAVENOUS; SUBCUTANEOUS at 21:18

## 2019-12-16 NOTE — ED PROVIDER NOTE - CLINICAL SUMMARY MEDICAL DECISION MAKING FREE TEXT BOX
- headache x 1 month, unclear etiology, eval mass, consider idiopathic intracranial hypertension  - basic labs, CT head, meds, reassess

## 2019-12-16 NOTE — ED PROVIDER NOTE - PATIENT PORTAL LINK FT
You can access the FollowMyHealth Patient Portal offered by [facility] by registering at the following website: [email or web address]. By joining [employer]’s FollowMyHealth portal, you will also be able to view your health information using other applications (apps) compatible with our system.

## 2019-12-16 NOTE — ED PROVIDER NOTE - NSFOLLOWUPINSTRUCTIONS_ED_ALL_ED_FT
Please follow up with your doctor regarding the headaches.    Recommend neurology follow up if symptoms persist, see referral list provided.    Return to the emergency department for any worsening symptoms.

## 2019-12-16 NOTE — ED PROVIDER NOTE - OBJECTIVE STATEMENT
19F denies pmh presents with 3-4 weeks of waxing and waning headaches.  Improves with Tylenol at home.  Intermittently with blurry vision and intermittent nausea.  Denies fever/chills, cp, sob, vomiting, focal neuro deficits.

## 2019-12-18 ENCOUNTER — FORM ENCOUNTER (OUTPATIENT)
Age: 19
End: 2019-12-18

## 2020-01-24 ENCOUNTER — RESULT REVIEW (OUTPATIENT)
Age: 20
End: 2020-01-24

## 2020-01-24 ENCOUNTER — OUTPATIENT (OUTPATIENT)
Dept: OUTPATIENT SERVICES | Facility: HOSPITAL | Age: 20
LOS: 1 days | Discharge: HOME | End: 2020-01-24
Payer: COMMERCIAL

## 2020-01-24 DIAGNOSIS — Z98.89 OTHER SPECIFIED POSTPROCEDURAL STATES: Chronic | ICD-10-CM

## 2020-01-24 LAB — GLUCOSE BLDC GLUCOMTR-MCNC: 112 MG/DL — HIGH (ref 70–99)

## 2020-01-24 PROCEDURE — 82962 GLUCOSE BLOOD TEST: CPT

## 2020-01-24 PROCEDURE — 88305 TISSUE EXAM BY PATHOLOGIST: CPT

## 2020-01-24 PROCEDURE — 43239 EGD BIOPSY SINGLE/MULTIPLE: CPT

## 2020-01-24 PROCEDURE — 88305 TISSUE EXAM BY PATHOLOGIST: CPT | Mod: 26

## 2020-01-27 LAB — SURGICAL PATHOLOGY STUDY: SIGNIFICANT CHANGE UP

## 2020-01-30 DIAGNOSIS — R73.03 PREDIABETES: ICD-10-CM

## 2020-01-30 DIAGNOSIS — Z01.818 ENCOUNTER FOR OTHER PREPROCEDURAL EXAMINATION: ICD-10-CM

## 2020-01-30 DIAGNOSIS — Z79.84 LONG TERM (CURRENT) USE OF ORAL HYPOGLYCEMIC DRUGS: ICD-10-CM

## 2020-01-30 DIAGNOSIS — K21.9 GASTRO-ESOPHAGEAL REFLUX DISEASE WITHOUT ESOPHAGITIS: ICD-10-CM

## 2020-01-30 DIAGNOSIS — A04.8 OTHER SPECIFIED BACTERIAL INTESTINAL INFECTIONS: ICD-10-CM

## 2020-01-30 DIAGNOSIS — E66.01 MORBID (SEVERE) OBESITY DUE TO EXCESS CALORIES: ICD-10-CM

## 2020-01-30 DIAGNOSIS — K44.9 DIAPHRAGMATIC HERNIA WITHOUT OBSTRUCTION OR GANGRENE: ICD-10-CM

## 2020-02-05 ENCOUNTER — APPOINTMENT (OUTPATIENT)
Dept: BARIATRICS | Facility: CLINIC | Age: 20
End: 2020-02-05
Payer: COMMERCIAL

## 2020-02-05 VITALS
HEART RATE: 100 BPM | DIASTOLIC BLOOD PRESSURE: 88 MMHG | HEIGHT: 64.5 IN | SYSTOLIC BLOOD PRESSURE: 138 MMHG | BODY MASS INDEX: 49.41 KG/M2 | OXYGEN SATURATION: 99 % | WEIGHT: 293 LBS

## 2020-02-05 PROCEDURE — 99214 OFFICE O/P EST MOD 30 MIN: CPT

## 2020-02-05 NOTE — PHYSICAL EXAM
[Obese, well nourished, in no acute distress] : obese, well nourished, in no acute distress [de-identified] : truncus adiposity [Normal] : grossly intact [de-identified] : gynoid distribution

## 2020-02-05 NOTE — HISTORY OF PRESENT ILLNESS
[de-identified] : This is a super morbidly obese female with bmi of 65 who comes for potential bariatric surgery.  She did not finish high school and is working as a  at [employer] about 20 hours weekly.  I was concerned about her affect and her see Damaris Reese,who feels that she can handle surgery on an emotional basis\par she is working towards ged\par while I believe for her level of obesity ds would be best am reluctant to proceed in single stage\par will do vsg and then reassess and then perform second stage

## 2020-02-05 NOTE — PROCEDURE
[FreeTextEntry1] : has been followed for 6 months\par proceed with weight loss \par and get certification for surgery

## 2020-03-16 ENCOUNTER — APPOINTMENT (OUTPATIENT)
Dept: BARIATRICS | Facility: CLINIC | Age: 20
End: 2020-03-16
Payer: COMMERCIAL

## 2020-03-16 PROCEDURE — 97803 MED NUTRITION INDIV SUBSEQ: CPT

## 2020-03-16 PROCEDURE — 98968 PH1 ASSMT&MGMT NQHP 21-30: CPT

## 2020-04-21 VITALS — HEIGHT: 64.5 IN | WEIGHT: 293 LBS | BODY MASS INDEX: 49.41 KG/M2

## 2020-06-15 ENCOUNTER — APPOINTMENT (OUTPATIENT)
Dept: BARIATRICS | Facility: CLINIC | Age: 20
End: 2020-06-15
Payer: COMMERCIAL

## 2020-06-15 VITALS — WEIGHT: 293 LBS

## 2020-06-15 PROCEDURE — 98968 PH1 ASSMT&MGMT NQHP 21-30: CPT

## 2020-06-15 PROCEDURE — 97803 MED NUTRITION INDIV SUBSEQ: CPT

## 2020-06-29 ENCOUNTER — APPOINTMENT (OUTPATIENT)
Dept: BARIATRICS | Facility: CLINIC | Age: 20
End: 2020-06-29
Payer: COMMERCIAL

## 2020-06-29 PROCEDURE — 98968 PH1 ASSMT&MGMT NQHP 21-30: CPT

## 2020-06-29 PROCEDURE — 97803 MED NUTRITION INDIV SUBSEQ: CPT

## 2020-08-11 ENCOUNTER — APPOINTMENT (OUTPATIENT)
Dept: BARIATRICS | Facility: CLINIC | Age: 20
End: 2020-08-11
Payer: COMMERCIAL

## 2020-08-11 VITALS — HEIGHT: 64.5 IN | WEIGHT: 293 LBS | BODY MASS INDEX: 49.41 KG/M2

## 2020-08-11 PROCEDURE — 99213 OFFICE O/P EST LOW 20 MIN: CPT | Mod: 95

## 2020-08-11 RX ORDER — METFORMIN HYDROCHLORIDE 625 MG/1
TABLET ORAL
Refills: 0 | Status: ACTIVE | COMMUNITY

## 2020-08-11 RX ORDER — LEVOTHYROXINE SODIUM 0.17 MG/1
TABLET ORAL
Refills: 0 | Status: ACTIVE | COMMUNITY

## 2020-08-11 RX ORDER — OMEPRAZOLE MAGNESIUM 20 MG/1
20 CAPSULE, DELAYED RELEASE ORAL
Refills: 0 | Status: ACTIVE | COMMUNITY

## 2020-08-17 ENCOUNTER — APPOINTMENT (OUTPATIENT)
Dept: BARIATRICS | Facility: CLINIC | Age: 20
End: 2020-08-17
Payer: COMMERCIAL

## 2020-08-17 DIAGNOSIS — E07.9 DISORDER OF THYROID, UNSPECIFIED: ICD-10-CM

## 2020-08-17 PROCEDURE — 97803 MED NUTRITION INDIV SUBSEQ: CPT

## 2020-08-18 PROBLEM — E07.9 THYROID DISEASE: Status: ACTIVE | Noted: 2019-07-10

## 2020-08-22 ENCOUNTER — LABORATORY RESULT (OUTPATIENT)
Age: 20
End: 2020-08-22

## 2020-08-24 ENCOUNTER — TRANSCRIPTION ENCOUNTER (OUTPATIENT)
Age: 20
End: 2020-08-24

## 2020-08-24 NOTE — PATIENT PROFILE ADULT - LEGAL HELP
Mr. Jewel Michel is here for management of anticoagulation for nonspecified AFib. PMH also significant for hypertension, diabetes, stroke. He presents today w/out complaint. Pt denies s/s bleeding/bruising/swelling/SOB. No BRBPR. No melena. Denies any missed doses  No changes in RX/OTCs/Herbal medications. Reviewed dietary concerns. Does like to eat spinach occasionally. Denies EToH and tobacco use (however daughter states that pt drinks bourbon)    Patient refuses to come back any sooner than 4 weeks. Typically stable on this dose, so will have him hold one day and resume dosing. INR 3.8 is above acceptable therapeutic range of 2-3. Recommend to HOLD today, then continue 7.5 mg daily, except 10 mg M/W/F. Patient has 5 mg tablets. Will continue to monitor and check INR in 4 weeks. - does not like coming back earlier than a month. Dosing card and appointment reminder given to patient.    Return to clinic: 11/16 @ 7:45am no

## 2020-08-25 ENCOUNTER — APPOINTMENT (OUTPATIENT)
Dept: BARIATRICS | Facility: HOSPITAL | Age: 20
End: 2020-08-25

## 2020-08-25 ENCOUNTER — INPATIENT (INPATIENT)
Facility: HOSPITAL | Age: 20
LOS: 1 days | Discharge: HOME | DRG: 621 | End: 2020-08-27
Attending: SURGERY | Admitting: SURGERY
Payer: COMMERCIAL

## 2020-08-25 ENCOUNTER — RESULT REVIEW (OUTPATIENT)
Age: 20
End: 2020-08-25

## 2020-08-25 VITALS
TEMPERATURE: 98 F | HEART RATE: 82 BPM | OXYGEN SATURATION: 99 % | SYSTOLIC BLOOD PRESSURE: 130 MMHG | HEIGHT: 66 IN | DIASTOLIC BLOOD PRESSURE: 84 MMHG | RESPIRATION RATE: 16 BRPM | WEIGHT: 293 LBS

## 2020-08-25 DIAGNOSIS — Z98.89 OTHER SPECIFIED POSTPROCEDURAL STATES: Chronic | ICD-10-CM

## 2020-08-25 LAB
GLUCOSE BLDC GLUCOMTR-MCNC: 103 MG/DL — HIGH (ref 70–99)
GLUCOSE BLDC GLUCOMTR-MCNC: 132 MG/DL — HIGH (ref 70–99)
GLUCOSE BLDC GLUCOMTR-MCNC: 149 MG/DL — HIGH (ref 70–99)

## 2020-08-25 PROCEDURE — 88307 TISSUE EXAM BY PATHOLOGIST: CPT | Mod: 26

## 2020-08-25 PROCEDURE — 43775 LAP SLEEVE GASTRECTOMY: CPT

## 2020-08-25 RX ORDER — BACITRACIN ZINC 500 UNIT/G
1 OINTMENT IN PACKET (EA) TOPICAL
Refills: 0 | Status: DISCONTINUED | OUTPATIENT
Start: 2020-08-25 | End: 2020-08-27

## 2020-08-25 RX ORDER — ACETAMINOPHEN 500 MG
1000 TABLET ORAL ONCE
Refills: 0 | Status: COMPLETED | OUTPATIENT
Start: 2020-08-25 | End: 2020-08-26

## 2020-08-25 RX ORDER — DEXTROSE 50 % IN WATER 50 %
25 SYRINGE (ML) INTRAVENOUS ONCE
Refills: 0 | Status: DISCONTINUED | OUTPATIENT
Start: 2020-08-25 | End: 2020-08-27

## 2020-08-25 RX ORDER — ONDANSETRON 8 MG/1
4 TABLET, FILM COATED ORAL ONCE
Refills: 0 | Status: COMPLETED | OUTPATIENT
Start: 2020-08-25 | End: 2020-08-26

## 2020-08-25 RX ORDER — INSULIN LISPRO 100/ML
VIAL (ML) SUBCUTANEOUS
Refills: 0 | Status: DISCONTINUED | OUTPATIENT
Start: 2020-08-25 | End: 2020-08-27

## 2020-08-25 RX ORDER — PANTOPRAZOLE SODIUM 20 MG/1
40 TABLET, DELAYED RELEASE ORAL DAILY
Refills: 0 | Status: DISCONTINUED | OUTPATIENT
Start: 2020-08-25 | End: 2020-08-27

## 2020-08-25 RX ORDER — ENOXAPARIN SODIUM 100 MG/ML
30 INJECTION SUBCUTANEOUS ONCE
Refills: 0 | Status: COMPLETED | OUTPATIENT
Start: 2020-08-25 | End: 2020-08-25

## 2020-08-25 RX ORDER — SODIUM CHLORIDE 9 MG/ML
1000 INJECTION, SOLUTION INTRAVENOUS
Refills: 0 | Status: DISCONTINUED | OUTPATIENT
Start: 2020-08-25 | End: 2020-08-27

## 2020-08-25 RX ORDER — CHOLECALCIFEROL (VITAMIN D3) 125 MCG
0 CAPSULE ORAL
Qty: 0 | Refills: 0 | DISCHARGE

## 2020-08-25 RX ORDER — AMOXICILLIN 250 MG/5ML
1 SUSPENSION, RECONSTITUTED, ORAL (ML) ORAL
Qty: 0 | Refills: 0 | DISCHARGE

## 2020-08-25 RX ORDER — DEXTROSE 50 % IN WATER 50 %
15 SYRINGE (ML) INTRAVENOUS ONCE
Refills: 0 | Status: DISCONTINUED | OUTPATIENT
Start: 2020-08-25 | End: 2020-08-27

## 2020-08-25 RX ORDER — HYDROMORPHONE HYDROCHLORIDE 2 MG/ML
0.5 INJECTION INTRAMUSCULAR; INTRAVENOUS; SUBCUTANEOUS ONCE
Refills: 0 | Status: DISCONTINUED | OUTPATIENT
Start: 2020-08-25 | End: 2020-08-25

## 2020-08-25 RX ORDER — DEXTROSE 50 % IN WATER 50 %
12.5 SYRINGE (ML) INTRAVENOUS ONCE
Refills: 0 | Status: DISCONTINUED | OUTPATIENT
Start: 2020-08-25 | End: 2020-08-27

## 2020-08-25 RX ORDER — HYDROMORPHONE HYDROCHLORIDE 2 MG/ML
0.25 INJECTION INTRAMUSCULAR; INTRAVENOUS; SUBCUTANEOUS ONCE
Refills: 0 | Status: DISCONTINUED | OUTPATIENT
Start: 2020-08-25 | End: 2020-08-27

## 2020-08-25 RX ORDER — GLUCAGON INJECTION, SOLUTION 0.5 MG/.1ML
1 INJECTION, SOLUTION SUBCUTANEOUS ONCE
Refills: 0 | Status: DISCONTINUED | OUTPATIENT
Start: 2020-08-25 | End: 2020-08-27

## 2020-08-25 RX ORDER — LEVOTHYROXINE SODIUM 125 MCG
1 TABLET ORAL
Qty: 0 | Refills: 0 | DISCHARGE

## 2020-08-25 RX ORDER — ACETAMINOPHEN 500 MG
1000 TABLET ORAL ONCE
Refills: 0 | Status: COMPLETED | OUTPATIENT
Start: 2020-08-25 | End: 2020-08-25

## 2020-08-25 RX ORDER — ACETAMINOPHEN 500 MG
650 TABLET ORAL EVERY 6 HOURS
Refills: 0 | Status: DISCONTINUED | OUTPATIENT
Start: 2020-08-25 | End: 2020-08-27

## 2020-08-25 RX ORDER — METFORMIN HYDROCHLORIDE 850 MG/1
1 TABLET ORAL
Qty: 0 | Refills: 0 | DISCHARGE

## 2020-08-25 RX ORDER — LEVOTHYROXINE SODIUM 125 MCG
100 TABLET ORAL DAILY
Refills: 0 | Status: DISCONTINUED | OUTPATIENT
Start: 2020-08-25 | End: 2020-08-27

## 2020-08-25 RX ORDER — GABAPENTIN 400 MG/1
300 CAPSULE ORAL ONCE
Refills: 0 | Status: COMPLETED | OUTPATIENT
Start: 2020-08-25 | End: 2020-08-25

## 2020-08-25 RX ORDER — ONDANSETRON 8 MG/1
4 TABLET, FILM COATED ORAL EVERY 6 HOURS
Refills: 0 | Status: DISCONTINUED | OUTPATIENT
Start: 2020-08-25 | End: 2020-08-27

## 2020-08-25 RX ORDER — SCOPALAMINE 1 MG/3D
1 PATCH, EXTENDED RELEASE TRANSDERMAL ONCE
Refills: 0 | Status: COMPLETED | OUTPATIENT
Start: 2020-08-25 | End: 2020-08-25

## 2020-08-25 RX ADMIN — HYDROMORPHONE HYDROCHLORIDE 0.5 MILLIGRAM(S): 2 INJECTION INTRAMUSCULAR; INTRAVENOUS; SUBCUTANEOUS at 16:30

## 2020-08-25 RX ADMIN — Medication 1000 MILLIGRAM(S): at 17:00

## 2020-08-25 RX ADMIN — SCOPALAMINE 1 PATCH: 1 PATCH, EXTENDED RELEASE TRANSDERMAL at 08:31

## 2020-08-25 RX ADMIN — Medication 400 MILLIGRAM(S): at 16:30

## 2020-08-25 RX ADMIN — GABAPENTIN 300 MILLIGRAM(S): 400 CAPSULE ORAL at 08:30

## 2020-08-25 RX ADMIN — Medication 1000 MILLIGRAM(S): at 08:30

## 2020-08-25 RX ADMIN — Medication 1 APPLICATION(S): at 19:49

## 2020-08-25 RX ADMIN — ENOXAPARIN SODIUM 30 MILLIGRAM(S): 100 INJECTION SUBCUTANEOUS at 08:30

## 2020-08-25 RX ADMIN — SODIUM CHLORIDE 170 MILLILITER(S): 9 INJECTION, SOLUTION INTRAVENOUS at 17:37

## 2020-08-25 RX ADMIN — SCOPALAMINE 1 PATCH: 1 PATCH, EXTENDED RELEASE TRANSDERMAL at 19:48

## 2020-08-25 RX ADMIN — HYDROMORPHONE HYDROCHLORIDE 0.5 MILLIGRAM(S): 2 INJECTION INTRAMUSCULAR; INTRAVENOUS; SUBCUTANEOUS at 16:08

## 2020-08-25 RX ADMIN — PANTOPRAZOLE SODIUM 40 MILLIGRAM(S): 20 TABLET, DELAYED RELEASE ORAL at 17:29

## 2020-08-25 NOTE — PRE-OP CHECKLIST - SELECT TESTS ORDERED
UGI, Echo, stress test/BMP/CBC/Urinalysis/EKG/CXR BMP/CBC/Urinalysis/EKG/UGI, Echo, stress test, urine HCG negative/HCG/CXR

## 2020-08-25 NOTE — H&P ADULT - NSHPPHYSICALEXAM_GEN_ALL_CORE
Physical Exam:  General: NAD  Pulmonary: Nonlabored breathing, no respiratory distress  Cardiovascular: NSR  Abdominal: soft, NT/ND, no organomegaly  Extremities: WWP, normal strength, no clubbing/cyanosis/edema  Neuro: A/O x3, no focal deficits, normal sensation  Pulses: palpable distal pulses

## 2020-08-25 NOTE — H&P ADULT - NSHPREVIEWOFSYSTEMS_GEN_ALL_CORE
Review of Systems: REVIEW OF SYSTEMS:  	CONSTITUTIONAL: No weakness, fevers or chills  	EYES/ENT: No visual changes;  No vertigo or throat pain   	NECK: No pain or stiffness  	RESPIRATORY: No cough, wheezing, hemoptysis; No shortness of breath  	CARDIOVASCULAR: No chest pain or palpitations  	GASTROINTESTINAL: No Diarrhea, vomiting, constipation  	GENITOURINARY: No dysuria, frequency or hematuria  	NEUROLOGICAL: No numbness or weakness  SKIN: No itching, rashes

## 2020-08-25 NOTE — BRIEF OPERATIVE NOTE - OPERATION/FINDINGS
Stomach divided along 36Fr Bougie edge using 6 staple loads (45mm x 1, 60mm x 5). Hemostasis achieved. Fascia at 12mm post site closed. Port sites x 4 closed w/ 4-0 Monocryl sutures & surgical glue.

## 2020-08-25 NOTE — H&P ADULT - HISTORY OF PRESENT ILLNESS
21 YO female with PMH significant for super morbid obesity with BMI of 65, DM2, hypothyroidism, depression, fibroids and surgical history significant for appendectomy presents  today for a lap sleeve gastric resection.

## 2020-08-25 NOTE — H&P ADULT - ASSESSMENT
19 YO female with PMH significant for super morbid obesity with BMI of 65, DM2, hypothyroidism, depression, fibroids and surgical history significant for appendectomy presents  today for a lap sleeve gastric resection.     Plan  - NPO  - OR today with Dr. Berger for Sleeve gastrectomy  - Post op orders pending

## 2020-08-26 ENCOUNTER — TRANSCRIPTION ENCOUNTER (OUTPATIENT)
Age: 20
End: 2020-08-26

## 2020-08-26 LAB
A1C WITH ESTIMATED AVERAGE GLUCOSE RESULT: 5.5 % — SIGNIFICANT CHANGE UP (ref 4–5.6)
ANION GAP SERPL CALC-SCNC: 11 MMOL/L — SIGNIFICANT CHANGE UP (ref 5–17)
BASOPHILS # BLD AUTO: 0.01 K/UL — SIGNIFICANT CHANGE UP (ref 0–0.2)
BASOPHILS NFR BLD AUTO: 0.1 % — SIGNIFICANT CHANGE UP (ref 0–2)
BUN SERPL-MCNC: 12 MG/DL — SIGNIFICANT CHANGE UP (ref 7–23)
CALCIUM SERPL-MCNC: 9.6 MG/DL — SIGNIFICANT CHANGE UP (ref 8.4–10.5)
CHLORIDE SERPL-SCNC: 100 MMOL/L — SIGNIFICANT CHANGE UP (ref 96–108)
CO2 SERPL-SCNC: 23 MMOL/L — SIGNIFICANT CHANGE UP (ref 22–31)
CREAT SERPL-MCNC: 0.81 MG/DL — SIGNIFICANT CHANGE UP (ref 0.5–1.3)
EOSINOPHIL # BLD AUTO: 0 K/UL — SIGNIFICANT CHANGE UP (ref 0–0.5)
EOSINOPHIL NFR BLD AUTO: 0 % — SIGNIFICANT CHANGE UP (ref 0–6)
ESTIMATED AVERAGE GLUCOSE: 111 MG/DL — SIGNIFICANT CHANGE UP (ref 68–114)
GLUCOSE BLDC GLUCOMTR-MCNC: 102 MG/DL — HIGH (ref 70–99)
GLUCOSE BLDC GLUCOMTR-MCNC: 102 MG/DL — HIGH (ref 70–99)
GLUCOSE BLDC GLUCOMTR-MCNC: 111 MG/DL — HIGH (ref 70–99)
GLUCOSE BLDC GLUCOMTR-MCNC: 115 MG/DL — HIGH (ref 70–99)
GLUCOSE SERPL-MCNC: 124 MG/DL — HIGH (ref 70–99)
HCT VFR BLD CALC: 37.7 % — SIGNIFICANT CHANGE UP (ref 34.5–45)
HGB BLD-MCNC: 12.3 G/DL — SIGNIFICANT CHANGE UP (ref 11.5–15.5)
IMM GRANULOCYTES NFR BLD AUTO: 0.4 % — SIGNIFICANT CHANGE UP (ref 0–1.5)
LYMPHOCYTES # BLD AUTO: 1.13 K/UL — SIGNIFICANT CHANGE UP (ref 1–3.3)
LYMPHOCYTES # BLD AUTO: 10.5 % — LOW (ref 13–44)
MAGNESIUM SERPL-MCNC: 2 MG/DL — SIGNIFICANT CHANGE UP (ref 1.6–2.6)
MCHC RBC-ENTMCNC: 29.6 PG — SIGNIFICANT CHANGE UP (ref 27–34)
MCHC RBC-ENTMCNC: 32.6 GM/DL — SIGNIFICANT CHANGE UP (ref 32–36)
MCV RBC AUTO: 90.8 FL — SIGNIFICANT CHANGE UP (ref 80–100)
MONOCYTES # BLD AUTO: 0.39 K/UL — SIGNIFICANT CHANGE UP (ref 0–0.9)
MONOCYTES NFR BLD AUTO: 3.6 % — SIGNIFICANT CHANGE UP (ref 2–14)
NEUTROPHILS # BLD AUTO: 9.17 K/UL — HIGH (ref 1.8–7.4)
NEUTROPHILS NFR BLD AUTO: 85.4 % — HIGH (ref 43–77)
NRBC # BLD: 0 /100 WBCS — SIGNIFICANT CHANGE UP (ref 0–0)
PHOSPHATE SERPL-MCNC: 2.5 MG/DL — SIGNIFICANT CHANGE UP (ref 2.5–4.5)
PLATELET # BLD AUTO: 291 K/UL — SIGNIFICANT CHANGE UP (ref 150–400)
POTASSIUM SERPL-MCNC: 4.3 MMOL/L — SIGNIFICANT CHANGE UP (ref 3.5–5.3)
POTASSIUM SERPL-SCNC: 4.3 MMOL/L — SIGNIFICANT CHANGE UP (ref 3.5–5.3)
RBC # BLD: 4.15 M/UL — SIGNIFICANT CHANGE UP (ref 3.8–5.2)
RBC # FLD: 14.3 % — SIGNIFICANT CHANGE UP (ref 10.3–14.5)
SODIUM SERPL-SCNC: 134 MMOL/L — LOW (ref 135–145)
WBC # BLD: 10.74 K/UL — HIGH (ref 3.8–10.5)
WBC # FLD AUTO: 10.74 K/UL — HIGH (ref 3.8–10.5)

## 2020-08-26 RX ORDER — BENZOCAINE AND MENTHOL 5; 1 G/100ML; G/100ML
1 LIQUID ORAL THREE TIMES A DAY
Refills: 0 | Status: DISCONTINUED | OUTPATIENT
Start: 2020-08-26 | End: 2020-08-27

## 2020-08-26 RX ORDER — ONDANSETRON 8 MG/1
4 TABLET, FILM COATED ORAL ONCE
Refills: 0 | Status: COMPLETED | OUTPATIENT
Start: 2020-08-26 | End: 2020-08-26

## 2020-08-26 RX ORDER — OMEPRAZOLE 10 MG/1
1 CAPSULE, DELAYED RELEASE ORAL
Qty: 30 | Refills: 0
Start: 2020-08-26 | End: 2020-09-24

## 2020-08-26 RX ORDER — ACETAMINOPHEN 500 MG
2 TABLET ORAL
Qty: 112 | Refills: 0
Start: 2020-08-26 | End: 2020-09-08

## 2020-08-26 RX ORDER — APIXABAN 2.5 MG/1
1 TABLET, FILM COATED ORAL
Qty: 60 | Refills: 0
Start: 2020-08-26 | End: 2020-09-24

## 2020-08-26 RX ORDER — BENZOCAINE AND MENTHOL 5; 1 G/100ML; G/100ML
1 LIQUID ORAL ONCE
Refills: 0 | Status: COMPLETED | OUTPATIENT
Start: 2020-08-26 | End: 2020-08-26

## 2020-08-26 RX ADMIN — Medication 400 MILLIGRAM(S): at 06:47

## 2020-08-26 RX ADMIN — SCOPALAMINE 1 PATCH: 1 PATCH, EXTENDED RELEASE TRANSDERMAL at 06:52

## 2020-08-26 RX ADMIN — PANTOPRAZOLE SODIUM 40 MILLIGRAM(S): 20 TABLET, DELAYED RELEASE ORAL at 11:08

## 2020-08-26 RX ADMIN — Medication 1 APPLICATION(S): at 17:18

## 2020-08-26 RX ADMIN — ONDANSETRON 4 MILLIGRAM(S): 8 TABLET, FILM COATED ORAL at 18:32

## 2020-08-26 RX ADMIN — ONDANSETRON 4 MILLIGRAM(S): 8 TABLET, FILM COATED ORAL at 07:20

## 2020-08-26 RX ADMIN — ONDANSETRON 4 MILLIGRAM(S): 8 TABLET, FILM COATED ORAL at 03:36

## 2020-08-26 RX ADMIN — SCOPALAMINE 1 PATCH: 1 PATCH, EXTENDED RELEASE TRANSDERMAL at 18:53

## 2020-08-26 RX ADMIN — Medication 650 MILLIGRAM(S): at 11:08

## 2020-08-26 RX ADMIN — Medication 100 MICROGRAM(S): at 05:38

## 2020-08-26 RX ADMIN — Medication 650 MILLIGRAM(S): at 11:38

## 2020-08-26 RX ADMIN — SODIUM CHLORIDE 85 MILLILITER(S): 9 INJECTION, SOLUTION INTRAVENOUS at 21:17

## 2020-08-26 RX ADMIN — BENZOCAINE AND MENTHOL 1 LOZENGE: 5; 1 LIQUID ORAL at 14:56

## 2020-08-26 RX ADMIN — ONDANSETRON 4 MILLIGRAM(S): 8 TABLET, FILM COATED ORAL at 12:38

## 2020-08-26 RX ADMIN — Medication 1 APPLICATION(S): at 05:38

## 2020-08-26 RX ADMIN — Medication 1000 MILLIGRAM(S): at 07:29

## 2020-08-26 RX ADMIN — BENZOCAINE AND MENTHOL 1 LOZENGE: 5; 1 LIQUID ORAL at 21:17

## 2020-08-26 NOTE — DISCHARGE NOTE PROVIDER - CARE PROVIDER_API CALL
Cory Berger  SURGERY  [address] [place] [address]  Phone: [phone]  Fax: [phone]  Follow Up Time:

## 2020-08-26 NOTE — DISCHARGE NOTE PROVIDER - NSDCACTIVITY_GEN_ALL_CORE
Showering allowed Walking - Indoors allowed/Stairs allowed/Showering allowed/No heavy lifting/straining/Walking - Outdoors allowed

## 2020-08-26 NOTE — DISCHARGE NOTE PROVIDER - NSDCFUSCHEDAPPT_GEN_ALL_CORE_FT
ROSALIE SLAUGHTER ; 09/09/2020 ; NPP Surg Bariatric [address] ORSALIE SLAUGHTER ; 09/09/2020 ; NPP Surg Bariatric [address]

## 2020-08-26 NOTE — DIETITIAN INITIAL EVALUATION ADULT. - OTHER INFO
20F with hx of MO (BMI 65), DM2, hypothyroidism, depression, fibroids admitting for elective lap sleeve gastrectomy (8/25), now POD #1. On assessment, pt resting in bed. Currently on BARICLLIQ diet, tolerating PO. Pt had poor PO intake this morning, consuming 1oz prior to 930 and noting N/V (improved with zofran). Pain well controlled. Discussed volumes of various cup sizes on tray table and encouraged aiming for 4 oz/hr as tolerated. Prepared vitamins with plan to get protein shakes after discharge (list provided). RD provided indepth edu on diet advancement and specific nutrient needs s/p LSG. NKFA. No dietary restrictions at home. Skin: surgical incisions. GI: WDL per flowsheet. RD to follow up per protocol.

## 2020-08-26 NOTE — DISCHARGE NOTE PROVIDER - NSDCMRMEDTOKEN_GEN_ALL_CORE_FT
metFORMIN 500 mg oral tablet: 1 tab(s) orally 2 times a day  pantoprazole 20 mg oral delayed release tablet: 1 tab(s) orally once a day   Synthroid 100 mcg (0.1 mg) oral tablet: 1 tab(s) orally once a day  Vitamin D3 5000 intl units (125 mcg) oral capsule: Eliquis 2.5 mg oral tablet: 1 tab(s) orally 2 times a day please start taking on 8/28/20 (post op day #3)  metFORMIN 500 mg oral tablet: 1 tab(s) orally 2 times a day  omeprazole 40 mg oral delayed release capsule: 1 cap(s) orally once a day   Synthroid 100 mcg (0.1 mg) oral tablet: 1 tab(s) orally once a day  Tylenol 325 mg oral tablet: 2 tab(s) orally every 6 hours, As Needed for pain  Vitamin D3 5000 intl units (125 mcg) oral capsule:

## 2020-08-26 NOTE — DISCHARGE NOTE PROVIDER - NSDCCPCAREPLAN_GEN_ALL_CORE_FT
PRINCIPAL DISCHARGE DIAGNOSIS  Diagnosis: Morbid obesity  Assessment and Plan of Treatment: Please resume all regular home medications.  Please take omeprazole 40mg as prescribed daily  Please take eliquis 2.5mg twice a day for 30 days starting on 8/28/20.  Please take tylenol 650mg every 6 hours for pain.   Please get plenty of rest, continue to ambulate several times per day, and drink adequate amounts of fluids.   Avoid lifting weights greater than 5-10 lbs until you follow-up with your surgeon, who will instruct you further regarding activity restrictions.  Please continue eating a bariatric clear liquid diet.   Please follow-up with your surgeon Dr. Berger in 2 weeks. Please call his office at [phone] to schedule an appointment.

## 2020-08-26 NOTE — DISCHARGE NOTE PROVIDER - HOSPITAL COURSE
19 YO female with PMH significant for super morbid obesity with BMI of 65, DM2, hypothyroidism, depression, fibroids and surgical history significant for appendectomy. Patient went to the OR on 8/25 for laparoscopic sleeve gastrectomy. The patient tolerated the procedure well and there were no complications. Post operatively the patients diet was advanced to bariatric clear liquid diet which was tolerated. On post op day 1 the patients 21 YO female with PMH significant for super morbid obesity with BMI of 65, DM2, hypothyroidism, depression, fibroids and surgical history significant for appendectomy. Patient went to the OR on 8/25 for laparoscopic sleeve gastrectomy. The patient tolerated the procedure well and there were no complications. Post operatively the patients diet was advanced to bariatric clear liquid diet which was tolerated. On post op day 1 the patients pain was controlled and the patient was voiding without issues. On day of discharge the patients vitals were stable, pain controlled, she was tolerating diet. The patient is being discharged to home and is to follow up outpatient. The patient is being discharge on omeprazole, tylenol for pain, and eliquid 2.5mg twice a day to start on post op day 3 (8/28).

## 2020-08-26 NOTE — DISCHARGE NOTE PROVIDER - INSTRUCTIONS
bariatric clear liquid diet Diet: Bariatric Full Fluids. 60 grams protein daily.  64 fluid ounces water daily. Drink small sips throughout the day. Continue diet as outlined by paperwork received as a pre-operative patient.

## 2020-08-26 NOTE — DISCHARGE NOTE PROVIDER - NSDCFUADDINST_GEN_ALL_CORE_FT
Please call your doctor or nurse practitioner if you experience the following: You experience new chest pain, pressure, squeezing or tightness., New or worsening cough, shortness of breath, or wheeze. If you are vomiting and cannot keep down fluids or your medications. You are getting dehydrated due to continued vomiting, diarrhea, or other reasons. Signs of dehydration include dry mouth, rapid heartbeat, or feeling dizzy or faint when standing.You see blood or dark/black material when you vomit or have a bowel movement.  You experience burning when you urinate, have blood in your urine, or experience a discharge.  Your pain is not improving within 8-12 hours or is not gone within 24 hours. Call or return immediately if your pain is getting worse, changes location, or moves to your chest or back.  You have shaking chills, or fever greater than 101.5 degrees Fahrenheit or 38 degrees Celsius.  Any change in your symptoms, or any new symptoms that concern you. You may shower; soap and water over incision sites. Do not scrub. Pat dry when done. No tub bathing or swimming until cleared. Keep incision sites out of the sun as scars will darken. No heavy lifting (>10lbs) or strenuous exercise. You should be urinating at least 3-4x per day. Call the office if you experience increasing abdominal pain, nausea, vomiting, or temperature >100.4F.  NO ASPIRIN OR NSAIDs until approved by Dr. Berger. Avoid alcoholic beverages until cleared by Dr. Berger.

## 2020-08-26 NOTE — DIETITIAN INITIAL EVALUATION ADULT. - ENERGY NEEDS
Height: 66" Weight: 357lbs, IBW 130lbs+/-10%, %%, BMI 57.6 kg/m2  Above energy needs calculated for wt maintenance (20-25kcal/kg) using IBW (59kg)  Weeks 1-2 estimated needs: 590-710kcal/day (10-12kcal/kg), 71-89g pro/day (1.2-1.5g/kg), >/=64oz clear fluids.

## 2020-08-27 ENCOUNTER — TRANSCRIPTION ENCOUNTER (OUTPATIENT)
Age: 20
End: 2020-08-27

## 2020-08-27 VITALS
DIASTOLIC BLOOD PRESSURE: 74 MMHG | OXYGEN SATURATION: 97 % | TEMPERATURE: 99 F | RESPIRATION RATE: 18 BRPM | HEART RATE: 75 BPM | SYSTOLIC BLOOD PRESSURE: 116 MMHG

## 2020-08-27 LAB
ANION GAP SERPL CALC-SCNC: 13 MMOL/L — SIGNIFICANT CHANGE UP (ref 5–17)
APPEARANCE UR: CLEAR — SIGNIFICANT CHANGE UP
BACTERIA # UR AUTO: PRESENT /HPF
BILIRUB UR-MCNC: NEGATIVE — SIGNIFICANT CHANGE UP
BUN SERPL-MCNC: 14 MG/DL — SIGNIFICANT CHANGE UP (ref 7–23)
CALCIUM SERPL-MCNC: 9.1 MG/DL — SIGNIFICANT CHANGE UP (ref 8.4–10.5)
CHLORIDE SERPL-SCNC: 100 MMOL/L — SIGNIFICANT CHANGE UP (ref 96–108)
CO2 SERPL-SCNC: 25 MMOL/L — SIGNIFICANT CHANGE UP (ref 22–31)
COLOR SPEC: YELLOW — SIGNIFICANT CHANGE UP
CREAT SERPL-MCNC: 1.02 MG/DL — SIGNIFICANT CHANGE UP (ref 0.5–1.3)
DIFF PNL FLD: ABNORMAL
EPI CELLS # UR: SIGNIFICANT CHANGE UP /HPF (ref 0–5)
GLUCOSE BLDC GLUCOMTR-MCNC: 115 MG/DL — HIGH (ref 70–99)
GLUCOSE BLDC GLUCOMTR-MCNC: 87 MG/DL — SIGNIFICANT CHANGE UP (ref 70–99)
GLUCOSE SERPL-MCNC: 99 MG/DL — SIGNIFICANT CHANGE UP (ref 70–99)
GLUCOSE UR QL: NEGATIVE — SIGNIFICANT CHANGE UP
HCT VFR BLD CALC: 36.3 % — SIGNIFICANT CHANGE UP (ref 34.5–45)
HGB BLD-MCNC: 11.7 G/DL — SIGNIFICANT CHANGE UP (ref 11.5–15.5)
KETONES UR-MCNC: 15 MG/DL
LEUKOCYTE ESTERASE UR-ACNC: NEGATIVE — SIGNIFICANT CHANGE UP
MAGNESIUM SERPL-MCNC: 2 MG/DL — SIGNIFICANT CHANGE UP (ref 1.6–2.6)
MCHC RBC-ENTMCNC: 29.6 PG — SIGNIFICANT CHANGE UP (ref 27–34)
MCHC RBC-ENTMCNC: 32.2 GM/DL — SIGNIFICANT CHANGE UP (ref 32–36)
MCV RBC AUTO: 91.9 FL — SIGNIFICANT CHANGE UP (ref 80–100)
NITRITE UR-MCNC: NEGATIVE — SIGNIFICANT CHANGE UP
NRBC # BLD: 0 /100 WBCS — SIGNIFICANT CHANGE UP (ref 0–0)
PH UR: 6.5 — SIGNIFICANT CHANGE UP (ref 5–8)
PHOSPHATE SERPL-MCNC: 2.3 MG/DL — LOW (ref 2.5–4.5)
PLATELET # BLD AUTO: 258 K/UL — SIGNIFICANT CHANGE UP (ref 150–400)
POTASSIUM SERPL-MCNC: 3.7 MMOL/L — SIGNIFICANT CHANGE UP (ref 3.5–5.3)
POTASSIUM SERPL-SCNC: 3.7 MMOL/L — SIGNIFICANT CHANGE UP (ref 3.5–5.3)
PROT UR-MCNC: NEGATIVE MG/DL — SIGNIFICANT CHANGE UP
RBC # BLD: 3.95 M/UL — SIGNIFICANT CHANGE UP (ref 3.8–5.2)
RBC # FLD: 14.6 % — HIGH (ref 10.3–14.5)
RBC CASTS # UR COMP ASSIST: ABNORMAL /HPF
SODIUM SERPL-SCNC: 138 MMOL/L — SIGNIFICANT CHANGE UP (ref 135–145)
SP GR SPEC: 1.02 — SIGNIFICANT CHANGE UP (ref 1–1.03)
SURGICAL PATHOLOGY STUDY: SIGNIFICANT CHANGE UP
UROBILINOGEN FLD QL: 0.2 E.U./DL — SIGNIFICANT CHANGE UP
WBC # BLD: 9.36 K/UL — SIGNIFICANT CHANGE UP (ref 3.8–10.5)
WBC # FLD AUTO: 9.36 K/UL — SIGNIFICANT CHANGE UP (ref 3.8–10.5)
WBC UR QL: < 5 /HPF — SIGNIFICANT CHANGE UP

## 2020-08-27 PROCEDURE — 83036 HEMOGLOBIN GLYCOSYLATED A1C: CPT

## 2020-08-27 PROCEDURE — 82962 GLUCOSE BLOOD TEST: CPT

## 2020-08-27 PROCEDURE — 36415 COLL VENOUS BLD VENIPUNCTURE: CPT

## 2020-08-27 PROCEDURE — 86901 BLOOD TYPING SEROLOGIC RH(D): CPT

## 2020-08-27 PROCEDURE — 84100 ASSAY OF PHOSPHORUS: CPT

## 2020-08-27 PROCEDURE — 83735 ASSAY OF MAGNESIUM: CPT

## 2020-08-27 PROCEDURE — 85027 COMPLETE CBC AUTOMATED: CPT

## 2020-08-27 PROCEDURE — 80048 BASIC METABOLIC PNL TOTAL CA: CPT

## 2020-08-27 PROCEDURE — 85025 COMPLETE CBC W/AUTO DIFF WBC: CPT

## 2020-08-27 PROCEDURE — C1889: CPT

## 2020-08-27 PROCEDURE — 86850 RBC ANTIBODY SCREEN: CPT

## 2020-08-27 PROCEDURE — 88307 TISSUE EXAM BY PATHOLOGIST: CPT

## 2020-08-27 PROCEDURE — 81001 URINALYSIS AUTO W/SCOPE: CPT

## 2020-08-27 RX ORDER — OMEPRAZOLE 10 MG/1
1 CAPSULE, DELAYED RELEASE ORAL
Qty: 30 | Refills: 0
Start: 2020-08-27 | End: 2020-09-25

## 2020-08-27 RX ORDER — DEXAMETHASONE 0.5 MG/5ML
6 ELIXIR ORAL ONCE
Refills: 0 | Status: COMPLETED | OUTPATIENT
Start: 2020-08-27 | End: 2020-08-27

## 2020-08-27 RX ORDER — APIXABAN 2.5 MG/1
1 TABLET, FILM COATED ORAL
Qty: 60 | Refills: 0
Start: 2020-08-27 | End: 2020-09-25

## 2020-08-27 RX ORDER — POTASSIUM PHOSPHATE, MONOBASIC POTASSIUM PHOSPHATE, DIBASIC 236; 224 MG/ML; MG/ML
15 INJECTION, SOLUTION INTRAVENOUS ONCE
Refills: 0 | Status: COMPLETED | OUTPATIENT
Start: 2020-08-27 | End: 2020-08-27

## 2020-08-27 RX ORDER — ACETAMINOPHEN 500 MG
1000 TABLET ORAL ONCE
Refills: 0 | Status: COMPLETED | OUTPATIENT
Start: 2020-08-27 | End: 2020-08-27

## 2020-08-27 RX ORDER — ACETAMINOPHEN 500 MG
2 TABLET ORAL
Qty: 56 | Refills: 0
Start: 2020-08-27 | End: 2020-09-02

## 2020-08-27 RX ADMIN — Medication 1000 MILLIGRAM(S): at 00:45

## 2020-08-27 RX ADMIN — Medication 400 MILLIGRAM(S): at 00:29

## 2020-08-27 RX ADMIN — POTASSIUM PHOSPHATE, MONOBASIC POTASSIUM PHOSPHATE, DIBASIC 63.75 MILLIMOLE(S): 236; 224 INJECTION, SOLUTION INTRAVENOUS at 09:14

## 2020-08-27 RX ADMIN — ONDANSETRON 4 MILLIGRAM(S): 8 TABLET, FILM COATED ORAL at 00:20

## 2020-08-27 RX ADMIN — Medication 100 MICROGRAM(S): at 05:00

## 2020-08-27 RX ADMIN — PANTOPRAZOLE SODIUM 40 MILLIGRAM(S): 20 TABLET, DELAYED RELEASE ORAL at 11:16

## 2020-08-27 RX ADMIN — SODIUM CHLORIDE 85 MILLILITER(S): 9 INJECTION, SOLUTION INTRAVENOUS at 04:59

## 2020-08-27 RX ADMIN — BENZOCAINE AND MENTHOL 1 LOZENGE: 5; 1 LIQUID ORAL at 05:02

## 2020-08-27 RX ADMIN — Medication 6 MILLIGRAM(S): at 12:37

## 2020-08-27 RX ADMIN — Medication 1 APPLICATION(S): at 05:00

## 2020-08-27 NOTE — PROGRESS NOTE ADULT - REASON FOR ADMISSION
Super morbid obesity with BMI of 65, admitted for laparoscopic sleeve gastrectomy

## 2020-08-27 NOTE — PROGRESS NOTE ADULT - SUBJECTIVE AND OBJECTIVE BOX
SUBJECTIVE: Patient seen and examined bedside. Patient reports nausea and small episode of emesis. Patient not drinking much PO just a small amount of water.        Vital Signs Last 24 Hrs  T(C): 37.2 (26 Aug 2020 05:35), Max: 37.4 (26 Aug 2020 03:43)  T(F): 98.9 (26 Aug 2020 05:35), Max: 99.3 (26 Aug 2020 03:43)  HR: 74 (26 Aug 2020 05:35) (72 - 119)  BP: 139/92 (26 Aug 2020 05:35) (125/56 - 148/88)  BP(mean): 109 (25 Aug 2020 18:42) (84 - 109)  RR: 17 (26 Aug 2020 05:35) (10 - 24)  SpO2: 99% (26 Aug 2020 05:35) (97% - 100%)  I&O's Detail    25 Aug 2020 07:01  -  26 Aug 2020 07:00  --------------------------------------------------------  IN:    lactated ringers.: 2380 mL  Total IN: 2380 mL    OUT:    Voided: 1500 mL  Total OUT: 1500 mL    Total NET: 880 mL          General: NAD, resting comfortably in bed  C/V: NSR  Pulm: Nonlabored breathing, no respiratory distress  Abd: soft, obese, nondistended, tender to palpation near surgical incisions, surgical incisions clean, dry, and intact.  Extrem: WWP, no edema, SCDs in place        LABS:                RADIOLOGY & ADDITIONAL STUDIES:
STATUS POST:  LSG    SUBJECTIVE: Pt seen ~ 2 hours post op. Pt reports upper abdominal pain 8/10, received dilaudid x1. Denies nausea, vomiting, chest pain, SOB, calf pain.     Vital Signs Last 24 Hrs  T(C): 36.4 (25 Aug 2020 15:45), Max: 36.7 (25 Aug 2020 07:38)  T(F): 97.6 (25 Aug 2020 15:45), Max: 98.1 (25 Aug 2020 07:38)  HR: 87 (25 Aug 2020 17:30) (82 - 119)  BP: 136/61 (25 Aug 2020 17:30) (125/56 - 137/65)  BP(mean): 88 (25 Aug 2020 17:30) (84 - 91)  RR: 20 (25 Aug 2020 17:30) (10 - 24)  SpO2: 100% (25 Aug 2020 17:30) (99% - 100%)  I&O's Summary    25 Aug 2020 07:01  -  25 Aug 2020 17:45  --------------------------------------------------------  IN: 340 mL / OUT: 0 mL / NET: 340 mL      I&O's Detail    25 Aug 2020 07:01  -  25 Aug 2020 17:45  --------------------------------------------------------  IN:    lactated ringers.: 340 mL  Total IN: 340 mL    OUT:  Total OUT: 0 mL    Total NET: 340 mL        LABS:      Physical exam :  General: resting in bed, no acute distress  Neuro: AOx3  CV: NSR  Pulm: non-labored breathing, no respiratory distress, on 5L NC  Abdomen: soft, obese abdomen, TTP in epigastrium and kate-umbilical , incisions are c/d/i.  Extremities: wwp, SCDs in place
SUBJECTIVE: Patient seen and examined bedside. Patient reports she is tolerating 2-3oz/hr of fluids. Patient was nauseous overnight but its improving. Patient reports that her pain is controlled.        Vital Signs Last 24 Hrs  T(C): 37.2 (27 Aug 2020 05:39), Max: 37.6 (26 Aug 2020 17:15)  T(F): 98.9 (27 Aug 2020 05:39), Max: 99.6 (26 Aug 2020 17:15)  HR: 69 (27 Aug 2020 05:39) (69 - 92)  BP: 147/89 (27 Aug 2020 05:39) (128/80 - 153/93)  BP(mean): --  RR: 18 (27 Aug 2020 05:39) (17 - 18)  SpO2: 98% (27 Aug 2020 05:39) (96% - 100%)  I&O's Detail    26 Aug 2020 07:01  -  27 Aug 2020 07:00  --------------------------------------------------------  IN:    lactated ringers.: 1955 mL    Oral Fluid: 300 mL  Total IN: 2255 mL    OUT:    Voided: 700 mL  Total OUT: 700 mL    Total NET: 1555 mL          General: NAD, resting comfortably in bed  C/V: NSR  Pulm: Nonlabored breathing, no respiratory distress  Abd: obese, nondistended, nontender, surgical incisions clean, dry ,and intact.  Extrem: WWP, no edema, SCDs in place        LABS:                        11.7   9.36  )-----------( 258      ( 27 Aug 2020 06:01 )             36.3     08-27    138  |  100  |  14  ----------------------------<  99  3.7   |  25  |  1.02    Ca    9.1      27 Aug 2020 06:01  Phos  2.3     08-27  Mg     2.0     08-27            RADIOLOGY & ADDITIONAL STUDIES:

## 2020-08-27 NOTE — PROGRESS NOTE ADULT - ASSESSMENT
20F with PMH significant for super MO (BMI 65), DM2, hypothyroidism, depression, fibroids and surgical hx significant for appendectomy presents  today for a lap sleeve gastric resection.     BCLD  LR  Protonix  Pain/nausea control  Dietician consult in AM  OOBA/IS/SCDs
20F with PMH significant for super MO (BMI 65), DM2, hypothyroidism, depression, fibroids and surgical hx significant for appendectomy now post op from LSG. Pt reports some abdominal pain, given dilaudid x1 and tylenol. Abdominal exam is appropriate.    BCLD  LR  Protonix  Pain/nausea control  Dietician consult in AM  OOBA/IS/SCDs
20F with PMH significant for super MO (BMI 65), DM2, hypothyroidism, depression, fibroids and surgical hx significant for appendectomy presents  today for a lap sleeve gastric resection.     BCLD  LR  Protonix  Pain/nausea control  Dietician consult in AM  OOBA/IS/SCDs  possible d/c today 8/27

## 2020-08-31 DIAGNOSIS — E03.9 HYPOTHYROIDISM, UNSPECIFIED: ICD-10-CM

## 2020-08-31 DIAGNOSIS — E66.01 MORBID (SEVERE) OBESITY DUE TO EXCESS CALORIES: ICD-10-CM

## 2020-08-31 DIAGNOSIS — E11.9 TYPE 2 DIABETES MELLITUS WITHOUT COMPLICATIONS: ICD-10-CM

## 2020-08-31 DIAGNOSIS — K21.9 GASTRO-ESOPHAGEAL REFLUX DISEASE WITHOUT ESOPHAGITIS: ICD-10-CM

## 2020-08-31 DIAGNOSIS — F32.9 MAJOR DEPRESSIVE DISORDER, SINGLE EPISODE, UNSPECIFIED: ICD-10-CM

## 2020-09-09 ENCOUNTER — APPOINTMENT (OUTPATIENT)
Dept: BARIATRICS | Facility: CLINIC | Age: 20
End: 2020-09-09
Payer: COMMERCIAL

## 2020-09-09 VITALS — WEIGHT: 293 LBS

## 2020-09-09 PROCEDURE — 99024 POSTOP FOLLOW-UP VISIT: CPT

## 2020-09-09 NOTE — ASSESSMENT
[FreeTextEntry1] : Naty Villalta is seen in follow up approx 2 weeks s/p lap sleeve gastrectomy. Unremarkable post op course. Follow up in 2 months.

## 2020-09-09 NOTE — END OF VISIT
[FreeTextEntry3] : All medical record entries made by the Scribe were at my, Dr. Berger's, discretion and personally dictated by me on 08/19/2020. I have reviewed the chart and agree that the record accurately reflects my personal performance of the history, physical exam, assessment and plan. I have also personally directed, reviewed and agreed to the chart.

## 2020-09-09 NOTE — ADDENDUM
[FreeTextEntry1] : This note was written by Tri Black on 08/19/2020 acting as scribe for Dr. Berger.

## 2020-09-09 NOTE — HISTORY OF PRESENT ILLNESS
[Home] : at home, [unfilled] , at the time of the visit. [Medical Office: ([facility])___] : at the medical office located in  [Verbal consent obtained from patient] : the patient, [unfilled] [de-identified] : Naty Villalta is seen in follow up s/p lap sleeve gastrectomy. Now approximately 2 weeks post op. Unremarkable post op course. Tolerating diet. Next steps discussed. States she's taking vitamins. She is active and back at work. The importance of long term understanding of nutrition explained. Follow up in 2 months.

## 2020-09-17 PROBLEM — D21.9 BENIGN NEOPLASM OF CONNECTIVE AND OTHER SOFT TISSUE, UNSPECIFIED: Chronic | Status: ACTIVE | Noted: 2020-08-24

## 2020-09-20 NOTE — ED PROVIDER NOTE - PRIOR EKG STATUS
gross hematuria with clots, on eliquis - will need continuous bladder irrigation  - at time of admission H/H stable 13.4/36.3 - trend CBC- Hgb 12.2 this AM  - UA: large blood, RBC > 50, occasional bact, small leuk esterase  - hemodynamically stable at present  - hold eliquis for now given the amount of bleeding despite stable H/H  - can consider easily reversible agent (heparin gtt) if H/H remains stable- Heme/Onc consulted, may not need to restart AC- will check MRI to evaluate renal vein thrombus   - Urology consulted by ER: Dr. Graves, follow up recs the EKG is unchanged from prior EKG

## 2020-10-01 NOTE — ED PROVIDER NOTE - PMH
BREAST PROCEDURE - POST CARE INSTRUCTIONS    Procedure:  Right Ultrasound Guided Core Biopsy  x2 sites performed by Dr. Solorio.      You may notice some tenderness,bruising or discoloration around the site of your procedure.  This is normal and should disappear gradually over the next 7 to 10 days.  You may develop firmness at the biopsy site that is tender to the touch.  An area that is small (e.g. the size of a marble) is normal bruising and will gradually go away.  If you notice a firm area that is larger (e.g. the size of an egg or bigger), please contact your physician.    If you notice excessive bleeding (bleeding that you cannot control with 15 minutes of direct, continuous, firm pressure), redness, heat, drainage or have severe pain from the procedure site, please contact the physician that ordered your procedure.  If you are unable to reach your ordering physician, please contact: Breast Imaging Nurse [phone].    Day of the Procedure - Breast Biopsy    · Wear a good, supportive bra for the day and night of the procedure.  If your breast is still sore after this time frame, continue to wear a supportive bra at night.  · Place an ice pack inside your bra on top of the dressing for 20 minutes every hour until bedtime.  · You may take Tylenol (acetaminophen) for discomfort.   · DO NOT take aspirin, ibuprofen, Advil, Motrin, naproxen sodium or Aleve for at least 24 hours after the procedure unless specifically approved by your physician.  · DO NOT participate in strenuous activity for at least 24 hours after your procedure (sports, exercise, heavy lifting).  Do not lift more than 10 pounds with the biopsy side (approximately equal to one gallon of milk) for 48 hours.  · Avoid getting the procedure area wet for at least 24 hours.  You may sponge bathe if needed.    Continued Care    · Remove the outer dressing if it becomes blood stained and apply a new bandage or clean dressing over the steri-strips.  You  may remove the outer dressing the day after your procedure.  Do not remove the steri-strips.  They will fall off on their own in about a week.   · You may take a shower or tub bath with the steri-strips in place.  It is okay to get the steri-strips wet, but do not scrub the area.  If the edges curl up, you may trim them with a scissors.  You can use a band aid if you are having any drainage or a steri-strip falls off.  · You may resume most normal activities the next day.  However, please avoid swimming and hot tub use until the incision is healed.    Results    · You will be notified of your procedure results by the Radiologist within 2 business days.  · Your post procedure mammogram for marker placement showed the The breast marker is in an adequate position..     Depression    Hypothyroid

## 2020-12-09 ENCOUNTER — APPOINTMENT (OUTPATIENT)
Dept: BARIATRICS | Facility: CLINIC | Age: 20
End: 2020-12-09
Payer: COMMERCIAL

## 2020-12-09 VITALS — BODY MASS INDEX: 48.91 KG/M2 | HEIGHT: 64.5 IN | WEIGHT: 290 LBS

## 2020-12-09 PROCEDURE — 99213 OFFICE O/P EST LOW 20 MIN: CPT | Mod: 95

## 2020-12-09 NOTE — END OF VISIT
[FreeTextEntry3] : All medical record entries made by the Scribe were at my, Dr. Berger's, discretion and personally dictated by me on 12/09/2020  . I have reviewed the chart and agree that the record accurately reflects my personal performance of the history, physical exam, assessment and plan. I have also personally directed, reviewed and agreed to the chart.

## 2020-12-09 NOTE — ADDENDUM
[FreeTextEntry1] : This note was written by Tri Black on 12/09/2020  acting as scribe for Dr. Berger.

## 2020-12-09 NOTE — HISTORY OF PRESENT ILLNESS
[Home] : at home, [unfilled] , at the time of the visit. [Medical Office: ([facility])___] : at the medical office located in  [Verbal consent obtained from patient] : the patient, [unfilled] [de-identified] : Naty Vilallta is seen in follow up via phone consult. She is now approximately 5 months s/p lap sleeve gastrectomy as first portion of DS. Down approximately 90 lb. Doing well. Encouraged to keep losing weight. Plan is to follow every 3 months, and once weight loss stabilizes if she is still in severe morbid obese range, will proceed to DS. She will follow up in late February/early March.\par \par

## 2021-07-20 ENCOUNTER — EMERGENCY (EMERGENCY)
Facility: HOSPITAL | Age: 21
LOS: 1 days | Discharge: HOME | End: 2021-07-20
Attending: EMERGENCY MEDICINE | Admitting: EMERGENCY MEDICINE
Payer: COMMERCIAL

## 2021-07-20 VITALS
HEART RATE: 97 BPM | RESPIRATION RATE: 16 BRPM | DIASTOLIC BLOOD PRESSURE: 74 MMHG | HEIGHT: 66 IN | SYSTOLIC BLOOD PRESSURE: 137 MMHG | TEMPERATURE: 98 F | OXYGEN SATURATION: 100 %

## 2021-07-20 DIAGNOSIS — Z98.89 OTHER SPECIFIED POSTPROCEDURAL STATES: Chronic | ICD-10-CM

## 2021-07-20 LAB
APPEARANCE UR: ABNORMAL
BACTERIA # UR AUTO: ABNORMAL
BILIRUB UR-MCNC: NEGATIVE — SIGNIFICANT CHANGE UP
BLD GP AB SCN SERPL QL: NEGATIVE — SIGNIFICANT CHANGE UP
COLOR SPEC: YELLOW — SIGNIFICANT CHANGE UP
DIFF PNL FLD: ABNORMAL
EPI CELLS # UR: SIGNIFICANT CHANGE UP
GLUCOSE UR QL: NEGATIVE — SIGNIFICANT CHANGE UP
HCT VFR BLD CALC: 39.3 % — SIGNIFICANT CHANGE UP (ref 34.5–45)
HGB BLD-MCNC: 12.8 G/DL — SIGNIFICANT CHANGE UP (ref 11.5–15.5)
KETONES UR-MCNC: ABNORMAL
LEUKOCYTE ESTERASE UR-ACNC: NEGATIVE — SIGNIFICANT CHANGE UP
MCHC RBC-ENTMCNC: 30.2 PG — SIGNIFICANT CHANGE UP (ref 27–34)
MCHC RBC-ENTMCNC: 32.6 GM/DL — SIGNIFICANT CHANGE UP (ref 32–36)
MCV RBC AUTO: 92.7 FL — SIGNIFICANT CHANGE UP (ref 80–100)
NITRITE UR-MCNC: NEGATIVE — SIGNIFICANT CHANGE UP
NRBC # BLD: 0 /100 WBCS — SIGNIFICANT CHANGE UP
NRBC # FLD: 0 K/UL — SIGNIFICANT CHANGE UP
PH UR: 6 — SIGNIFICANT CHANGE UP (ref 5–8)
PLATELET # BLD AUTO: 270 K/UL — SIGNIFICANT CHANGE UP (ref 150–400)
PROT UR-MCNC: ABNORMAL
RBC # BLD: 4.24 M/UL — SIGNIFICANT CHANGE UP (ref 3.8–5.2)
RBC # FLD: 13.1 % — SIGNIFICANT CHANGE UP (ref 10.3–14.5)
RBC CASTS # UR COMP ASSIST: SIGNIFICANT CHANGE UP /HPF (ref 0–4)
RH IG SCN BLD-IMP: POSITIVE — SIGNIFICANT CHANGE UP
SP GR SPEC: 1.04 — HIGH (ref 1.01–1.02)
UROBILINOGEN FLD QL: ABNORMAL
WBC # BLD: 8.95 K/UL — SIGNIFICANT CHANGE UP (ref 3.8–10.5)
WBC # FLD AUTO: 8.95 K/UL — SIGNIFICANT CHANGE UP (ref 3.8–10.5)
WBC UR QL: SIGNIFICANT CHANGE UP /HPF (ref 0–5)

## 2021-07-20 PROCEDURE — 76830 TRANSVAGINAL US NON-OB: CPT | Mod: 26

## 2021-07-20 PROCEDURE — 99285 EMERGENCY DEPT VISIT HI MDM: CPT

## 2021-07-20 NOTE — ED PROVIDER NOTE - NSFOLLOWUPCLINICS_GEN_ALL_ED_FT
[facility] Gynecology and Obstetrics  Gynceology/OB  [address] [place] [address]  Phone: [phone]  Fax:   Follow Up Time: 1-3 Days

## 2021-07-20 NOTE — ED PROVIDER NOTE - MDM ORDERS SUBMITTED SELECTION
Introduction Text (Please End With A Colon): The following procedure was deferred: LN2 Instructions (Optional): will have patient schedule separate appointment to more thoroughly evaluate Detail Level: Simple Labs/Imaging Studies/Medications

## 2021-07-20 NOTE — ED PROVIDER NOTE - CLINICAL SUMMARY MEDICAL DECISION MAKING FREE TEXT BOX
21 yof with pelvic pain and vaginal bleeding at 6 weeks and 2 days pregnant r/o ectopic vs threatened ab - labs, US, pelvic exam 21 yof with pelvic pain and vaginal bleeding at 6 weeks and 2 days pregnant r/o ectopic vs threatened ab - labs, US, pelvic exam    GYN recommends f/u with Dr Davison in 48 hours  Return precautions

## 2021-07-20 NOTE — ED PROVIDER NOTE - OBJECTIVE STATEMENT
21 yof with hx of anemia and thyroid issues. LMP 6/6/2021. Pt found out she was pregnant yesterday on UCG. Pt today had lower abdominal pressure and vaginal bleeding, mainly spotting. Pt called her OB, Dr. Mamie Davison and told to come to ED.  This is patient's first pregnancy. No urinary symptoms, no nausea, no vomiting.

## 2021-07-20 NOTE — ED ADULT NURSE NOTE - NSIMPLEMENTINTERV_GEN_ALL_ED
Implemented All Universal Safety Interventions:  [place] to call system. Call bell, personal items and telephone within reach. Instruct patient to call for assistance. Room bathroom lighting operational. Non-slip footwear when patient is off stretcher. Physically safe environment: no spills, clutter or unnecessary equipment. Stretcher in lowest position, wheels locked, appropriate side rails in place. Yes

## 2021-07-20 NOTE — ED PROVIDER NOTE - PATIENT PORTAL LINK FT
You can access the FollowMyHealth Patient Portal offered by [facility] by registering at the following website: [email or web address]. By joining [facility]’s FollowMyHealth portal, you will also be able to view your health information using other applications (apps) compatible with our system.

## 2021-07-20 NOTE — ED ADULT TRIAGE NOTE - CHIEF COMPLAINT QUOTE
6 wks pregnant having vaginal bleed with mild abdominal cramping today. Called OB today and was told she might be miscarrying. Hx of DM and fibroids.

## 2021-07-20 NOTE — ED PROVIDER NOTE - NSFOLLOWUPINSTRUCTIONS_ED_ALL_ED_FT
1) Please follow up with OBGYN for further evaluation  2) Return to the ED immediately for new or worsening symptoms including fever, nausea/vomiting, dizziness, headache, abdominal pain  3) Please continue to take any home medications as prescribed. Take Tylenol 325 mg every 4 hours for pain relief/fever control  4) Your test results from your ED visit were discussed with you prior to discharge  5) You were provided with a copy of your test results

## 2021-07-21 VITALS
SYSTOLIC BLOOD PRESSURE: 127 MMHG | RESPIRATION RATE: 15 BRPM | DIASTOLIC BLOOD PRESSURE: 78 MMHG | OXYGEN SATURATION: 100 % | HEART RATE: 78 BPM

## 2021-07-21 LAB
ALBUMIN SERPL ELPH-MCNC: 4.5 G/DL — SIGNIFICANT CHANGE UP (ref 3.3–5)
ALP SERPL-CCNC: 53 U/L — SIGNIFICANT CHANGE UP (ref 40–120)
ALT FLD-CCNC: 13 U/L — SIGNIFICANT CHANGE UP (ref 4–33)
ANION GAP SERPL CALC-SCNC: 20 MMOL/L — HIGH (ref 7–14)
AST SERPL-CCNC: 31 U/L — SIGNIFICANT CHANGE UP (ref 4–32)
BILIRUB SERPL-MCNC: 0.3 MG/DL — SIGNIFICANT CHANGE UP (ref 0.2–1.2)
BUN SERPL-MCNC: 16 MG/DL — SIGNIFICANT CHANGE UP (ref 7–23)
CALCIUM SERPL-MCNC: 9.4 MG/DL — SIGNIFICANT CHANGE UP (ref 8.4–10.5)
CHLORIDE SERPL-SCNC: 101 MMOL/L — SIGNIFICANT CHANGE UP (ref 98–107)
CO2 SERPL-SCNC: 16 MMOL/L — LOW (ref 22–31)
CREAT SERPL-MCNC: 1.2 MG/DL — SIGNIFICANT CHANGE UP (ref 0.5–1.3)
GLUCOSE SERPL-MCNC: 96 MG/DL — SIGNIFICANT CHANGE UP (ref 70–99)
HCG SERPL-ACNC: 973.7 MIU/ML — SIGNIFICANT CHANGE UP
POTASSIUM SERPL-MCNC: 5 MMOL/L — SIGNIFICANT CHANGE UP (ref 3.5–5.3)
POTASSIUM SERPL-SCNC: 5 MMOL/L — SIGNIFICANT CHANGE UP (ref 3.5–5.3)
PROT SERPL-MCNC: 7.6 G/DL — SIGNIFICANT CHANGE UP (ref 6–8.3)
SODIUM SERPL-SCNC: 137 MMOL/L — SIGNIFICANT CHANGE UP (ref 135–145)

## 2021-07-21 RX ORDER — SODIUM CHLORIDE 9 MG/ML
1000 INJECTION, SOLUTION INTRAVENOUS
Refills: 0 | Status: DISCONTINUED | OUTPATIENT
Start: 2021-07-21 | End: 2021-07-21

## 2021-07-21 NOTE — CONSULT NOTE ADULT - ASSESSMENT
21y  LMP / presents with vaginal bleeding and passage of a clot today. Vital signs stable, physical exam unremarkable and bHCG 973.7. TVUS unremarkable. Differential includes threatened AB vs. ectopic pregnancy vs. viable IUP vs. spontaneous  in progress.  Difficult to assess at this time.      - Patient to follow up in 48 hours for repeat b-HCG with Dr. Davison  - Rh type: positive.  No need for rhogam at this time.   - Ectopic precautions reviewed with patient.  Discussed with patient importance of follow up for b-HCG given unknown pregnancy location at this time.  Patient expressed understanding.  All questions and concerns addressed to patient's apparent satisfaction.   - Patient to be added to GYN b-HCG list for closer follow up.    - Patient stable for d/c home from GYN perspective.  Primary management per ED team.      LINA Schwarz PGY2  d/w Dr. Conn 21y  LMP 6/6 presents with vaginal bleeding and passage of a clot today. Vital signs stable, physical exam unremarkable and bHCG 973.7. TVUS unremarkable. Differential includes threatened AB vs. ectopic pregnancy vs. viable IUP vs. spontaneous  in progress.  Difficult to assess at this time.      - Patient to follow up in 48 hours for repeat b-HCG with Dr. Davison  - Rh type: positive.  No need for rhogam at this time.   - Ectopic precautions reviewed with patient.  Discussed with patient importance of follow up for b-HCG given unknown pregnancy location at this time.  Patient expressed understanding.  All questions and concerns addressed to patient's apparent satisfaction.   - Patient to be added to GYN b-HCG list for closer follow up.    - Patient stable for d/c home from GYN perspective.  Primary management per ED team.      LINA Schwarz PGY2  d/w Dr. Conn    20y/o  LMP  with pregnancy of unknown location; to f/u in 48 hours.  PRIYANK Conn M.D.

## 2021-07-21 NOTE — CONSULT NOTE ADULT - SUBJECTIVE AND OBJECTIVE BOX
INCOMPLETE NOTE GYN Consult Note    HPI:  21y  LMP  presents with vaginal bleeding and passage of a clot today. Denies abdominal pain, cramping, fevers, headaches, CP, SOB, edema.    OB/GYN HISTORY:    - current    Last Menstrual Period: , h/o PCOS, denies STIs, fibroids, abnormal paps    Name of GYN Physician: Dr. Davison      PAST MEDICAL & SURGICAL HISTORY:  Menorrhagia  Hypothyroid  Depression  Diabetes  Fibroids  History of appendectomy  Gastric bypass      REVIEW OF SYSTEMS  General: denies fevers, chills, tiredness  Skin/Breast: denies breast pain  Respiratory and Thorax: denies shortness of breath, denies cough  Cardiovascular: denies chest pain and denies palpitations  Gastrointestinal: denies abdominal pain, nausea/ vomiting	  Genitourinary: denies dysuria, increased urinary frequency, urgency	  Constitutional, Cardiovascular, Respiratory, Gastrointestinal, Genitourinary, Musculoskeletal and Integumentary review of systems are normal except as noted. 	    MEDICATIONS: Levothyroxine, Folic Acid, Vit D    Allergies  No Known Allergies    SOCIAL HISTORY: Denies toxic habits    FAMILY HISTORY:  No pertinent family history in first degree relatives    Vital Signs Last 24 Hrs  T(C): 36.8 (2021 20:37), Max: 36.8 (2021 20:37)  T(F): 98.3 (2021 20:37), Max: 98.3 (2021 20:37)  HR: 78 (2021 02:25) (78 - 98)  BP: 127/78 (2021 02:25) (127/78 - 150/100)  BP(mean): --  RR: 15 (2021 02:25) (14 - 16)  SpO2: 100% (2021 02:25) (100% - 100%)    PHYSICAL EXAM:   Gen: NAD, alert and oriented x 3  Cardiovascular: regular   Respiratory: breathing comfortably on RA  Abd: soft, non tender, non-distended  Pelvic: closed/long, no CMT, Uterus: normal size, non tender  Adnexa: non tender, no palpable masses  : minimal bleeding on pad  Extremities: NTBL  Skin: warm and well perfused      LABS:                        12.8   8.95  )-----------( 270      ( 2021 22:33 )             39.3     07-20    137  |  101  |  16  ----------------------------<  96  5.0   |  16<L>  |  1.20    Ca    9.4      2021 22:33    TPro  7.6  /  Alb  4.5  /  TBili  0.3  /  DBili  x   /  AST  31  /  ALT  13  /  AlkPhos  53  07-20      Urinalysis Basic - ( 2021 22:33 )    Color: Yellow / Appearance: Slightly Turbid / S.037 / pH: x  Gluc: x / Ketone: Trace  / Bili: Negative / Urobili: 3 mg/dL   Blood: x / Protein: 30 mg/dL / Nitrite: Negative   Leuk Esterase: Negative / RBC: 11-25 /HPF / WBC 0-2 /HPF   Sq Epi: x / Non Sq Epi: Few / Bacteria: Few    HCG Quantitative, Serum (21 @ 22:33)    HCG Quantitative, Serum: 973.7: For pregnancy evaluation the reference values are as follows:  Negative: <5 mIU/mL  Indeterminate: 5-25 mIU/mL (suggest repeat testing in 72hrs)  Positive: >25 mIU/mL  Note: hCG results of false positive and false negative for pregnancy are  rare, but can occur with this, and other hCG tests. [place]aya- and  post-menopausal females may have mildly elevated hCG concentrations,  usually less than 14 mIU/mL, that are constant over time.  Weeks of pregnancy:           mIU/mL  ------------------         -------          3                                6 -      71          4                              10 -     750          5                             220 -   7,100          6                             160 -  32,000          7                3,700 - 164,000          8                          32,000 - 150,000          9                          64,000 - 151,000         10                         47,000 - 187,000         12                         28,000 - 211,000         14                         14,000 -  63,000         15                         12,000 -  71,000         16 - 18                   8,000 -  58,000 mIU/mL      Type + Screen (21 @ 22:38)    ABO Interpretation: A    Rh Interpretation: Positive    Antibody Screen: Negative      RADIOLOGY & ADDITIONAL STUDIES:    < from: US Transvaginal (21 @ 23:09) >    EXAM:  US TRANSVAGINAL        PROCEDURE DATE:  2021         INTERPRETATION:  CLINICAL INFORMATION: Pelvic pain, vaginal bleeding. No hCG level available at time of dictation.    LMP: 2021.    COMPARISON: None available.    TECHNIQUE:  Endovaginal and transabdominal pelvic sonogram. Color and Spectral Doppler was performed.    FINDINGS:    Uterus: 10.0 x 5.6 x 6.6 cm. Within normal limits. Cervical canal is mildly fluid-filled.  Endometrium: 0.9 cm. Within normal limits.    Right ovary: 3.2 x 1.6 x 1.8 cm. Within normal limits. Normal arterial and venous waveforms.  Left ovary: 2.5 x 2.3 x 2.1 cm. A corpus luteal cyst measures 1.7 x 1.5 x 1.7 cm. Normal arterial and venous waveforms.    Fluid: None.    IMPRESSION:    Unremarkable pelvic ultrasound. The differential diagnosis includes pregnancy of unknown location and spontaneous . Recommend follow-up with serial beta-hCG and/or pelvic ultrasound for further evaluation.    < end of copied text >

## 2021-07-26 NOTE — CHART NOTE - NSCHARTNOTEFT_GEN_A_CORE
20yo , LMP 21, who presented on w/ VB and found to have PUL    -0815: Spoke with pt. Confirmed she followed up with her ob/gyn, Dr. Davison on . Said he repeat hCG was 588. Has another follow-up w/ Dr. Davison 9/10/21    Francesco Carrasco  PGY-1, Obstetrics & Gynecology     d/w Dr. Hilton

## 2021-07-28 ENCOUNTER — APPOINTMENT (OUTPATIENT)
Dept: BARIATRICS | Facility: CLINIC | Age: 21
End: 2021-07-28
Payer: COMMERCIAL

## 2021-07-28 VITALS — WEIGHT: 266 LBS | BODY MASS INDEX: 44.86 KG/M2 | HEIGHT: 64.5 IN

## 2021-07-28 DIAGNOSIS — Z98.84 BARIATRIC SURGERY STATUS: ICD-10-CM

## 2021-07-28 PROCEDURE — 99213 OFFICE O/P EST LOW 20 MIN: CPT | Mod: 95

## 2021-07-28 NOTE — ASSESSMENT
[FreeTextEntry1] : Pt is almost 1 year s/p lap sleeve gastrectomy 8/25/21 and doing well from a weight loss stand point but appears depressed due to recent miscarriage and sick partner. Patient interested in second stage surgical conversion to MDS but we discussed waiting until she is mentally healthy. Pt is seeking mental health counseling. She will meet with our dieticians and then follow up in a few months.

## 2021-07-28 NOTE — HISTORY OF PRESENT ILLNESS
[Home] : at home, [unfilled] , at the time of the visit. [Medical Office: ([facility])___] : at the medical office located in  [Verbal consent obtained from patient] : the patient, [unfilled] [de-identified] : Naty Villalta comes to see me via telehealth almost 1 year s/p lap sleeve gastrectomy 8/25/21. Down ~100 lbs. She appears depressed and when asked, states she recently had a miscarriage and her partner is in the hospital. Patient was interested in second stage surgical conversion to MDS but we discussed how she should be mentally healthy before she has the surgery. Discussed the importance of eating unprocessed foods and advised patient to eat fresh, whole foods with protein and fiber. \par

## 2021-07-28 NOTE — END OF VISIT
[FreeTextEntry3] : All medical record entries made by the Scribe were at my, Dr. Berger's, discretion and personally dictated by me on 07/28/2021. I have reviewed the chart and agree that the record accurately reflects my personal performance of the history, physical exam, assessment and plan. I have also personally directed, reviewed and agreed to the chart.

## 2021-07-28 NOTE — ADDENDUM
[FreeTextEntry1] : This note was written by Tri Black on 07/28/2021 acting as scribe for Dr. Berger.

## 2021-07-29 PROBLEM — Z98.84 S/P LAPAROSCOPIC SLEEVE GASTRECTOMY: Status: ACTIVE | Noted: 2021-07-29

## 2021-08-05 ENCOUNTER — APPOINTMENT (OUTPATIENT)
Dept: BARIATRICS | Facility: CLINIC | Age: 21
End: 2021-08-05
Payer: COMMERCIAL

## 2021-08-05 VITALS — WEIGHT: 266 LBS

## 2021-08-05 PROCEDURE — 97803 MED NUTRITION INDIV SUBSEQ: CPT | Mod: 95

## 2021-08-30 ENCOUNTER — APPOINTMENT (OUTPATIENT)
Dept: BARIATRICS | Facility: CLINIC | Age: 21
End: 2021-08-30

## 2021-09-16 ENCOUNTER — APPOINTMENT (OUTPATIENT)
Dept: BARIATRICS | Facility: CLINIC | Age: 21
End: 2021-09-16
Payer: COMMERCIAL

## 2021-09-16 VITALS — BODY MASS INDEX: 46.88 KG/M2 | HEIGHT: 64.5 IN | WEIGHT: 278 LBS

## 2021-09-16 DIAGNOSIS — E11.9 TYPE 2 DIABETES MELLITUS W/OUT COMPLICATIONS: ICD-10-CM

## 2021-09-16 DIAGNOSIS — E66.01 MORBID (SEVERE) OBESITY DUE TO EXCESS CALORIES: ICD-10-CM

## 2021-09-16 PROCEDURE — 97803 MED NUTRITION INDIV SUBSEQ: CPT | Mod: 95

## 2022-03-07 NOTE — ED ADULT NURSE NOTE - PMH
Depression    Diabetes    Hypothyroid    Menorrhagia Secondary Defect Length In Cm (Required For Flaps): 0

## 2022-03-24 ENCOUNTER — NON-APPOINTMENT (OUTPATIENT)
Age: 22
End: 2022-03-24

## 2022-04-14 NOTE — ED ADULT NURSE NOTE - NSIMPLEMENTINTERV_GEN_ALL_ED
Patient understands condition, prognosis and need for follow up care. Implemented All Universal Safety Interventions:  [place] to call system. Call bell, personal items and telephone within reach. Instruct patient to call for assistance. Room bathroom lighting operational. Non-slip footwear when patient is off stretcher. Physically safe environment: no spills, clutter or unnecessary equipment. Stretcher in lowest position, wheels locked, appropriate side rails in place.

## 2023-05-30 NOTE — ED ADULT NURSE NOTE - AGENT'S NAME

## 2024-06-27 NOTE — ED ADULT NURSE NOTE - TEMPLATE LIST FOR HEAD TO TOE ASSESSMENT
Refill must be reviewed and completed by the office or provider. The refill is unable to be approved or denied by the medication management team.       This refill cannot be delegated    General

## 2024-11-21 NOTE — ED ADULT NURSE NOTE - OBJECTIVE STATEMENT
18 yo F AAOx4 received to intake 10C c/o HA x "a few weeks," denies dizziness weakness HA blurry vision, reports "feeling nauseous sometimes," appears comfortable and in NAD, texting during RN assessment, 20G placed to RAC, blood work sent and pt medicated, awaiting CT at this time, will reassess

## 2025-05-30 NOTE — ED ADULT TRIAGE NOTE - NS ED TRIAGE AVPU SCALE
Alert-The patient is alert, awake and responds to voice. The patient is oriented to time, place, and person. The triage nurse is able to obtain subjective information. Opt out